# Patient Record
Sex: FEMALE | Race: WHITE | Employment: OTHER | URBAN - METROPOLITAN AREA
[De-identification: names, ages, dates, MRNs, and addresses within clinical notes are randomized per-mention and may not be internally consistent; named-entity substitution may affect disease eponyms.]

---

## 2017-01-03 ENCOUNTER — OFFICE VISIT (OUTPATIENT)
Dept: SURGERY | Age: 62
End: 2017-01-03

## 2017-01-03 VITALS
HEART RATE: 76 BPM | DIASTOLIC BLOOD PRESSURE: 85 MMHG | SYSTOLIC BLOOD PRESSURE: 154 MMHG | HEIGHT: 64 IN | BODY MASS INDEX: 25.44 KG/M2 | OXYGEN SATURATION: 95 % | WEIGHT: 149 LBS

## 2017-01-03 DIAGNOSIS — C50.412 BREAST CANCER OF UPPER-OUTER QUADRANT OF LEFT FEMALE BREAST (HCC): Primary | ICD-10-CM

## 2017-01-03 NOTE — PROGRESS NOTES
HISTORY OF PRESENT ILLNESS  Payal Hewitt is a 64 y.o. female who comes in for breast cancer follow up  Breast Cancer   Pertinent negatives include no chest pain, no abdominal pain, no headaches and no shortness of breath. Follow-up   Pertinent negatives include no chest pain, no abdominal pain, no headaches and no shortness of breath. She went for screening mammogram subsequently at left dx mammogram and US 2016 at [facility] and was noted to have a 2.9 x 2.1 x 2.6 cm hypoechoic mass at 0900 3 CFN in the left breast.   Prior to this mammogram she had not had a mammogram for over 20 years and felt it was not likely that anything was present. She denies having felt a mass. US core bx 2016 demonstrated IDC G3 ER 90% AL 30-40% her2/rachell 2+ unamplified by FISH. She denies prior breast problems, masses, skin changes, nipple changes or drainage, unexplained weight loss or bone pain. She had menarche at 15, is nulliparous, had menopause at 52 and did not take HRT. She denies family hx of breast or ovarian cancer. Her father  of liver cancer (heavy drinker). Breast MRI suggested only the known disease. She underwent a left lumpectomy and SLNB for a T2N0M0 IDC. Mammaprint suggested high risk. She received chemotherapy with Dr Darío Mcdonald and WBRT by Dr Audrey Armas and now has started letrozole.     Past Medical History   Diagnosis Date    Breast CA ([address]      L breast    Depression     Hyperlipemia      Past Surgical History   Procedure Laterality Date    Hx breast lumpectomy Left 2016     LEFT BREAST LUMPECTOMY WITH ULTRASOUND GUIDANCE/SENTINEL NODE BIOPSY performed by Kimberly Arora MD at [facility] AMBULATORY OR    Hx other surgical  2016     Insertion of right subclavian power port     Family History   Problem Relation Age of Onset    Alcohol abuse Mother     Cancer Father      liver    Alcohol abuse Father     No Known Problems Brother      Social History Substance Use Topics    Smoking status: Never Smoker    Smokeless tobacco: Never Used    Alcohol use 2.4 oz/week     4 Standard drinks or equivalent per week      Comment: occassionally     Current Outpatient Prescriptions   Medication Sig    PYRIDOXINE HCL (VITAMIN B-6 PO) Take  by mouth daily.  albuterol (PROVENTIL HFA, VENTOLIN HFA, PROAIR HFA) 90 mcg/actuation inhaler Take 1 Puff by inhalation every six (6) hours as needed for Wheezing.  metroNIDAZOLE (METROGEL) 0.75 % topical gel Apply  to affected area two (2) times a day.  citalopram (CELEXA) 40 mg tablet Take 1 Tab by mouth daily.  simvastatin (ZOCOR) 10 mg tablet Take 1 Tab by mouth nightly.  multivitamin (ONE A DAY) tablet Take 1 Tab by mouth daily.  HYDROcodone-acetaminophen (NORCO) 5-325 mg per tablet Take 1-2 Tabs by mouth every six (6) hours as needed for Pain. Max Daily Amount: 8 Tabs.  HYDROcodone-acetaminophen (NORCO) 5-325 mg per tablet Take 1-2 Tabs by mouth every six (6) hours as needed for Pain. Max Daily Amount: 8 Tabs. No current facility-administered medications for this visit. No Known Allergies    Review of Systems   Constitutional: Negative for chills, diaphoresis, fever, malaise/fatigue and weight loss. HENT: Negative for congestion, ear pain and sore throat. Eyes: Negative for blurred vision and pain. Respiratory: Negative for cough, hemoptysis, sputum production, shortness of breath, wheezing and stridor. Cardiovascular: Negative for chest pain, palpitations, orthopnea, claudication, leg swelling and PND. Gastrointestinal: Negative for abdominal pain, blood in stool, constipation, diarrhea, heartburn, melena, nausea and vomiting. Genitourinary: Negative for dysuria, flank pain, frequency, hematuria and urgency. Musculoskeletal: Negative for back pain, joint pain, myalgias and neck pain. Skin: Negative for itching and rash.    Neurological: Negative for dizziness, tremors, focal weakness, seizures, weakness and headaches. Endo/Heme/Allergies: Negative for polydipsia. Psychiatric/Behavioral: Negative for depression and memory loss. The patient is not nervous/anxious. Visit Vitals    /85    Pulse 76    Ht 5' 4\" (1.626 m)    Wt 67.6 kg (149 lb)    SpO2 95%    BMI 25.58 kg/m2       Physical Exam   Constitutional: She is oriented to person, place, and time. She appears well-developed and well-nourished. No distress. HENT:   Head: Normocephalic and atraumatic. Mouth/Throat: Oropharynx is clear and moist. No oropharyngeal exudate. Eyes: Conjunctivae and EOM are normal. Pupils are equal, round, and reactive to light. No scleral icterus. Neck: Normal range of motion. Neck supple. No JVD present. No tracheal deviation present. No thyromegaly present. Cardiovascular: Normal rate and regular rhythm. Exam reveals no gallop and no friction rub. No murmur heard. Pulmonary/Chest: Effort normal and breath sounds normal. No respiratory distress. She has no wheezes. She has no rales. Right breast exhibits tenderness. Right breast exhibits no inverted nipple, no mass, no nipple discharge and no skin change. Left breast exhibits tenderness. Left breast exhibits no inverted nipple, no mass, no nipple discharge and no skin change. Breasts are symmetrical (medium). Abdominal: Soft. Bowel sounds are normal. She exhibits no distension and no mass. There is no tenderness. There is no rebound and no guarding. Musculoskeletal: Normal range of motion. She exhibits no edema. Lymphadenopathy:     She has no cervical adenopathy. She has no axillary adenopathy. Right: No supraclavicular adenopathy present. Left: No supraclavicular adenopathy present. Neurological: She is alert and oriented to person, place, and time. No cranial nerve deficit. Skin: Skin is warm and dry. No rash noted. She is not diaphoretic. No erythema. No pallor.    Psychiatric: She has a normal mood and affect. Her behavior is normal. Judgment and thought content normal.     Her mammogram and US were not available for review today. ASSESSMENT and PLAN  1. Left breast ca stage 2 (T2N0M0) G3 IDC Er/MO pos, her2/rachell negative. AG at 7 months  Finished adjuvant chemotherapy and WBRT  Continue letrozole per Dr Rosaline Alvarado f/u with Catalina Mera and Татьяна Angry  Bilateral 3D dx mammogram May 2017  [id number] to remove port at her convenience  2. Needs screening colonoscopy.   Never had one and is reluctant to do so  RTC 6 months      Andres Ochoa MD FACS

## 2017-01-03 NOTE — MR AVS SNAPSHOT
Visit Information Date & Time Provider Department Dept. Phone Encounter #  
 1/3/2017 10:20 AM Steve Macias MD [facility] [place] [id number] Upcoming Health Maintenance Date Due Pneumococcal 19-64 Highest Risk (1 of 3 - PCV13) 3/27/1974 ZOSTER VACCINE AGE 60> 3/27/2015 FOBT Q 1 YEAR AGE 50-75 6/30/2017 BREAST CANCER SCRN MAMMOGRAM 4/7/2018 PAP AKA CERVICAL CYTOLOGY 7/16/2018 DTaP/Tdap/Td series (2 - Td) 3/24/2026 Allergies as of 1/3/2017  Review Complete On: 1/3/2017 By: Steve Macias MD  
 No Known Allergies Current Immunizations  Reviewed on 3/24/2016 Name Date Influenza Vaccine 11/30/2016, 12/1/2014 Tdap 3/24/2016 Not reviewed this visit You Were Diagnosed With   
  
 Codes Comments Breast cancer of upper-outer quadrant of left female breast ([facility] [address]    -  Primary ICD-10-CM: M42.331 ICD-9-CM: 174.4 Vitals BP Pulse Height(growth percentile) Weight(growth percentile) SpO2 BMI  
 154/85 76 5' 4\" (1.626 m) 149 lb (67.6 kg) 95% 25.58 kg/m2 OB Status Smoking Status Postmenopausal Never Smoker BMI and BSA Data Body Mass Index Body Surface Area 25.58 kg/m 2 1.75 m 2 Preferred Pharmacy Pharmacy Name Phone [facility] [address] [place] [address] Your Updated Medication List  
  
   
This list is accurate as of: 1/3/17 12:28 PM.  Always use your most recent med list.  
  
  
  
  
 albuterol 90 mcg/actuation inhaler Commonly known as:  PROVENTIL HFA, VENTOLIN HFA, PROAIR HFA Take 1 Puff by inhalation every six (6) hours as needed for Wheezing. citalopram 40 mg tablet Commonly known as:  [place] Party Take 1 Tab by mouth daily. metroNIDAZOLE 0.75 % topical gel Commonly known as:  [place] Rex Apply  to affected area two (2) times a day. multivitamin tablet Commonly known as:  ONE A DAY  
 Take 1 Tab by mouth daily. simvastatin 10 mg tablet Commonly known as:  ZOCOR Take 1 Tab by mouth nightly. VITAMIN B-6 PO Take  by mouth daily. To-Do List   
 05/08/2017 Imaging:  ROSA 3D LAUREANO W MAMMO BI DX INCL CAD Introducing [facility] SERVICES! Dear Stephon Key: Thank you for requesting a [employer] account. Our records indicate that you already have an active [employer] account. You can access your account anytime at [email or web address] Did you know that you can access your hospital and ER discharge instructions at any time in [employer]? You can also review all of your test results from your hospital stay or ER visit. Additional Information If you have questions, please visit the Frequently Asked Questions section of the [employer] website at [email or web address] Remember, [employer] is NOT to be used for urgent needs. For medical emergencies, dial 911. Now available from your iPhone and Android! Please provide this summary of care documentation to your next provider. Your primary care clinician is listed as Yu Ewing. If you have any questions after today's visit, please call [phone].

## 2017-01-10 DIAGNOSIS — F32.A DEPRESSION, UNSPECIFIED DEPRESSION TYPE: ICD-10-CM

## 2017-01-10 RX ORDER — CITALOPRAM 40 MG/1
40 TABLET, FILM COATED ORAL DAILY
Qty: 90 TAB | Refills: 3 | OUTPATIENT
Start: 2017-01-10

## 2017-01-10 RX ORDER — CITALOPRAM 40 MG/1
40 TABLET, FILM COATED ORAL DAILY
Qty: 90 TAB | Refills: 3 | Status: SHIPPED | OUTPATIENT
Start: 2017-01-10 | End: 2017-04-23 | Stop reason: SDUPTHER

## 2017-02-13 ENCOUNTER — TELEPHONE (OUTPATIENT)
Dept: ONCOLOGY | Age: 62
End: 2017-02-13

## 2017-02-13 NOTE — TELEPHONE ENCOUNTER
Unable to reach her today, and the  greeting is for a business--not her personal phone. Introduce myself as an NP with [employer] and that I don't have any bad news for her, but simply want to let her know some information I will be putting together for her and will be sending by mail in the next few days. Left both of my contact phone #s in the message for her return call. Will explain in greater detail whenever I am able to speak with her and give her the option of having this sent via [address]

## 2017-02-23 ENCOUNTER — DOCUMENTATION ONLY (OUTPATIENT)
Dept: ONCOLOGY | Age: 62
End: 2017-02-23

## 2017-02-23 NOTE — PROGRESS NOTES
[address]   [address] [place], [address], [address] [facility]    Breast Cancer Survivorship Care Plan      GENERAL INFORMATION    NAME/AGE/GENDER: Bryon Dunham is a 64 y.o. female who was born on 1955. PHONE: [phone]     Date: 2/23/2017    Referring Provider: none    Patient Care Team:  Raheem Brandt MD as PCP - General (Family Practice) [address] [facility] [phone]  Pamela Mackay MD as Surgeon (General Surgery) [facility] [phone]  Rain Cool NP as Nurse Practitioner (Cancer Survivorship) Medical Oncology [phone]  Alexandra Martins MD as Physician (Hematology and Oncology) [address] [place] [address] [phone]  Srinath Garcia MD as Physician (Radiation Oncology) [facility] [phone]    DIAGNOSIS    Cancer type/location/histologic subtype/receptor status: LEFT breast, 2.6 cm Grade II invasive ductal carcinoma and nuclear grade II ductal carcinoma in situ (DCIS), ER/SD+, HER2-       Date of diagnosis (year): 2016    TNM/Stage: pT2pN0/Stage 2    Oncotype DX results from 6/21/16--breast cancer recurrence score of 20 (intermediate risk), corresponding to an average rate of distant recurrence of 13% in 10 years. The clinical validation study included 668 patients from the NSABP B-14 study. The study included Stage 1 and 2 female breast cancer patients who were ER positive, lymph node negative, and were treated with Tamoxifen for 5 years. MammaPrint Genomic Profiling result--High Risk, per Dr. Donnell Bains note  A MammaPrint High Risk result means that a patient with early stage breast cancer has a higher risk of distant recurrence without adjuvant systemic therapy [chemotherapy]. For High Risk patients, there is a 29% probability of distant recurrence within 10 years.     FOLLOW-UP CARE PLAN    Cancer Surveillance or Other Recommended Related Tests        Name of test  When/How often Ordering Provider   Mammogram Once a year Dr. Lulu Badillo scan (bone density study) Consider every two years, after baseline study, while on Letrozole Dr. Gordon William     Please continue to see your primary care provider for all general health care recommended for a woman your age, including cancer screening tests. Possible late and long-term effects that someone with this type of cancer and treatment may experience:  bone effects, elevated cholesterol, lymphedema, arthralgias / myalgias (joint / muscle aches and pains), hot flashes and vaginal dryness. Please ask Dr. Gordon William if there are any potential late and long-term effects of your type of chemotherapy. Thank you! Schedule of Clinical Visits        Coordinating Provider  When/How often Contact information   Primary Care Provider As needed for non-cancer health care [phone] Every 3 to 6 months for the first 3 years, every 6 to 12 months for years 4 and 5, and annually thereafter [phone]     Radiation Oncologist Rotate visits with medical oncologist [phone]     Surgeon Rotate visits with medical oncologist [phone]       CANCER TREATMENT SUMMARY     Surgery: Date and procedure/location/findings: 5/23/16 LEFT lumpectomy and LEFT axillary sentinel lymph node biopsy, margins clear but DCIS <0.1 mm from medial margin, lymphovascular invasion not identified, 2 lymph nodes removed--both were negative for cancer    Radiation: Yes End date (year): completed 11/23/16   Body area treated/dosage: According to Dr. Chaz Myrick consult note, the plan was for you to have whole breast radiation to your left breast at a total dose of 4256 cGy with a boost of 6 additional fractions to the tumor bed.   Your end of treatment summary, which tells exactly how much radiation you received and to what areas, is not available to me in your [facility] record, [facility]. Please discuss these details with Dr. J Luis Chang at an upcoming appointment or ask him for a copy of your radiation end of treatment summary for your records. Thank you! Systemic Therapy (chemotherapy, biologics, other): Yes: I do not have access to your records with Dr. Ann Marie Paulino which detail your chemotherapy course. I would strongly advise that you ask her for a summary of this information when you return for a follow-up visit if you do not already have it. Thank you! Persistent symptoms or side effects at completion of treatment: unknown, per documentation in [facility]    Clinical Trial: No     Date of other cancer and/or recurrence of primary cancer and subsequent treatment: none, according to documentation in [address] [place] [address]    Family history of cancer: father with liver cancer    Genetic/hereditary risk factor(s) or predisposing conditions: none apparent  Genetic testing: No     CONTINUING TREATMENT    Need for Ongoing (Adjuvant) Treatment for Cancer: Yes, Letrozole 2.5 mg daily; began at the end of radiation therapy                Additional treatment name Possible side effects Planned duration   Anastrozole (Arimidex) or Letrozole (Femara) or Exemestane (Aromasin)   Joint aches and pains, vaginal dryness, loss of interest in sex, bone loss or thinning, bone fractures, elevated cholesterol, hot flashes Currently recommended for at least 5 years     DOING YOUR PART AS A CANCER SURVIVOR    Many survivors feel worried or anxious that the cancer will come back after treatment. While it often does not, its important to talk with your doctor about the possibility of the cancer returning. Most breast cancer recurrences are found by patients between visits.  Tell your doctor if you notice any of the following symptoms, as they may be signs of a cancer recurrence:     · New lumps in the breast  · Bone pain  · Chest pain  · Abdominal pain  · Shortness of breath or difficulty breathing  · Persistent headaches  · Persistent coughing  · Rash on breast  · Nipple discharge (liquid coming from the nipple)    Or any other symptoms should be brought to the attention of your provider:   1. Anything that represents a brand new symptom   2. Anything that represents a persistent symptom   3. Anything you are worried about that might be related to the cancer coming back    Cancer survivors may experience issues with the areas listed below. If you have any concerns in these or other areas, please speak with your survivorship nurse practitioner or a member of your physician team to find out how to get help with them. Emotional and mental health, fatigue, weight changes, stopping smoking, physical functioning, insurance, financial advice/assistance, school/work issues, parenting, memory or concentration loss, fertility, sexual functioning, or any other survivorship concerns            General Guidelines for Health and Cancer Prevention/Risk Reduction      · Work to achieve and maintain a healthy weight  · Engage in regular physical activity including:  Aerobic exercise at least 150 minutes per week                            Strength training exercise at least 2 days per week    · Eat a diet that is high in vegetables, fruits, whole grains, legumes (beans) and low in saturated fats (animal fats)    · Janet Buggy not start using tobacco  · Limit alcohol consumption to no more than 1 drink per day for women/no more than 2 drinks per day for men    If you have any questions or need additional information/support, please discuss these recommendations with your doctor or nurse practitioner.         RESOURCES FOR THE JOURNEY    Breast Cancer Specific:  Living Beyond Breast Cancer www.lbbc.org  Breast Cancer. org Marialuisamilar.no. [facility] [facility] http://wwMichael.betty. [address] www.[facility] [place]. org    General:  [facility] www.[facility]. [address] [facility] www.cancer. [address] www.cancer. org  American Society of Clinical Oncology http://kitchenDeep Ninese.com/. net/research-and-advocacy/asco-care-and-treatment-  recommendations-patients/follow-care-breast-cancer  [facility] www.[facility]. [facility] [employer] www.canceradvocacy. 3 Jennifer Sellers www.nccn. org  [facility] [place] [facility] www. patientadvocate. org    Prepared By: Miguelina Dias [facility]  [address]  [phone] or [phone]  [email or web address]    Delivered on: 2/24/17    This [address] [place] [address] is a cancer treatment summary and follow-up plan provided to you to keep with your healthcare records and to share with your healthcare providers. This summary is a brief record of major aspects of your cancer treatment, not a detailed or comprehensive record of your care.

## 2017-04-03 ENCOUNTER — OFFICE VISIT (OUTPATIENT)
Dept: FAMILY MEDICINE CLINIC | Age: 62
End: 2017-04-03

## 2017-04-03 VITALS
DIASTOLIC BLOOD PRESSURE: 86 MMHG | TEMPERATURE: 96.7 F | RESPIRATION RATE: 20 BRPM | WEIGHT: 150.2 LBS | OXYGEN SATURATION: 97 % | SYSTOLIC BLOOD PRESSURE: 138 MMHG | BODY MASS INDEX: 25.64 KG/M2 | HEART RATE: 81 BPM | HEIGHT: 64 IN

## 2017-04-03 DIAGNOSIS — I10 ESSENTIAL HYPERTENSION: Primary | ICD-10-CM

## 2017-04-03 DIAGNOSIS — F32.A DEPRESSION, UNSPECIFIED DEPRESSION TYPE: ICD-10-CM

## 2017-04-03 DIAGNOSIS — Z23 ENCOUNTER FOR IMMUNIZATION: ICD-10-CM

## 2017-04-03 DIAGNOSIS — G47.00 INSOMNIA, UNSPECIFIED TYPE: ICD-10-CM

## 2017-04-03 DIAGNOSIS — E78.5 HYPERLIPIDEMIA, UNSPECIFIED HYPERLIPIDEMIA TYPE: ICD-10-CM

## 2017-04-03 RX ORDER — HYDROCHLOROTHIAZIDE 12.5 MG/1
12.5 TABLET ORAL DAILY
Qty: 30 TAB | Refills: 0 | Status: SHIPPED | OUTPATIENT
Start: 2017-04-03 | End: 2017-12-07 | Stop reason: SDUPTHER

## 2017-04-03 RX ORDER — ZINC GLUCONATE 10 MG
LOZENGE ORAL
COMMUNITY
End: 2018-01-08

## 2017-04-03 RX ORDER — LETROZOLE 2.5 MG/1
TABLET, FILM COATED ORAL
COMMUNITY
Start: 2017-03-20

## 2017-04-03 NOTE — PROGRESS NOTES
Chief Complaint   Patient presents with    Hypertension     patient would like to discuss her blood pressure medication    Memory Loss     patient would like to discuss memory loss     Morning meds taken this Ivana Nunez LPN

## 2017-04-03 NOTE — PROGRESS NOTES
HISTORY OF PRESENT ILLNESS  Galileo Cast is a 58 y.o. female. Chief Complaint   Patient presents with    Hypertension     patient would like to discuss her blood pressure medication    Memory Loss     patient would like to discuss memory loss       HPI  HTN  Here for BP checks  Seen by Onc and BP high  And BP high at home, mostly in 140-150's/80-90's  On low salt diet  Never been on BP meds    Finished Chemo and radiation    Trouble sleeping until   Turmeric helped for 2 night  Drinks 2 glasses of ice tea    Trouble focusing  Trouble with memory  Dk finding words  Not unusual memory loss    Hyperlipidemia  Due for BW but not fasting  Will come back    Depression   On Celexa  Has refills      Review of Systems   Constitutional: Negative for fever. Respiratory: Negative for cough and shortness of breath. Cardiovascular: Negative for chest pain. Psychiatric/Behavioral: Positive for memory loss. The patient has insomnia. Past Medical History:   Diagnosis Date    Breast CA ([address] 5/16    L breast    Depression     Hyperlipemia      Current Outpatient Prescriptions   Medication Sig Dispense Refill    magnesium 250 mg tab Take  by mouth.  Omega-3 Fatty Acids (FISH OIL) 300 mg cap Take  by mouth.  hydroCHLOROthiazide (HYDRODIURIL) 12.5 mg tablet Take 1 Tab by mouth daily. 30 Tab 0    citalopram (CELEXA) 40 mg tablet Take 1 Tab by mouth daily. 90 Tab 3    simvastatin (ZOCOR) 10 mg tablet Take 1 Tab by mouth nightly. 90 Tab 3    multivitamin (ONE A DAY) tablet Take 1 Tab by mouth daily.  letrozole (FEMARA) 2.5 mg tablet       albuterol (PROVENTIL HFA, VENTOLIN HFA, PROAIR HFA) 90 mcg/actuation inhaler Take 1 Puff by inhalation every six (6) hours as needed for Wheezing. 1 Inhaler 0    metroNIDAZOLE (METROGEL) 0.75 % topical gel Apply  to affected area two (2) times a day.        No Known Allergies  Visit Vitals    /86 (BP 1 Location: Right arm, BP Patient Position: Sitting)    Pulse 81    Temp 96.7 °F (35.9 °C) (Oral)    Resp 20    Ht 5' 4\" (1.626 m)    Wt 150 lb 3.2 oz (68.1 kg)    SpO2 97%    BMI 25.78 kg/m2       Physical Exam   Constitutional: She is oriented to person, place, and time. She appears well-developed and well-nourished. No distress. HENT:   Head: Normocephalic and atraumatic. Left Ear: External ear normal.   Mouth/Throat: Oropharynx is clear and moist. No oropharyngeal exudate. Right Cerumen   Eyes: Conjunctivae and EOM are normal. Pupils are equal, round, and reactive to light. Cardiovascular: Normal rate, regular rhythm and normal heart sounds. Pulmonary/Chest: Effort normal and breath sounds normal.   Musculoskeletal: She exhibits no edema. Lymphadenopathy:     She has no cervical adenopathy. Neurological: She is alert and oriented to person, place, and time. Skin: Skin is warm and dry. Psychiatric: She has a normal mood and affect. Nursing note and vitals reviewed. ASSESSMENT and PLAN    ICD-10-CM ICD-9-CM    1. Essential hypertension I10 401.9 CBC WITH AUTOMATED DIFF      METABOLIC PANEL, COMPREHENSIVE      hydroCHLOROthiazide (HYDRODIURIL) 12.5 mg tablet   2. Hyperlipidemia, unspecified hyperlipidemia type E78.5 272.4 LIPID PANEL WITH LDL/HDL RATIO   3. Depression, unspecified depression type F32.9 311 Cont Celexa   4. Insomnia, unspecified type G47.00 780.52 Avoid ice tea and try hot milk   5.  Encounter for immunization Z23 V03.89 PNEUMOCOCCAL CONJ VACCINE 13 VALENT IM   recheck BP in 1 mo

## 2017-04-06 ENCOUNTER — CLINICAL SUPPORT (OUTPATIENT)
Dept: FAMILY MEDICINE CLINIC | Age: 62
End: 2017-04-06

## 2017-04-06 DIAGNOSIS — E78.5 HYPERLIPIDEMIA, UNSPECIFIED HYPERLIPIDEMIA TYPE: ICD-10-CM

## 2017-04-06 DIAGNOSIS — I10 ESSENTIAL HYPERTENSION: ICD-10-CM

## 2017-04-06 NOTE — PROGRESS NOTES
Future labs ordered by Dr Shireen Hneriquez (R) AC and Hand, patient tolerated procedure well.   Ajit Alberts RN

## 2017-04-07 LAB
ALBUMIN SERPL-MCNC: 4.3 G/DL (ref 3.6–4.8)
ALBUMIN/GLOB SERPL: 2 {RATIO} (ref 1.2–2.2)
ALP SERPL-CCNC: 71 IU/L (ref 39–117)
ALT SERPL-CCNC: 13 IU/L (ref 0–32)
AST SERPL-CCNC: 16 IU/L (ref 0–40)
BASOPHILS # BLD AUTO: 0 X10E3/UL (ref 0–0.2)
BASOPHILS NFR BLD AUTO: 1 %
BILIRUB SERPL-MCNC: 0.5 MG/DL (ref 0–1.2)
BUN SERPL-MCNC: 11 MG/DL (ref 8–27)
BUN/CREAT SERPL: 15 (ref 12–28)
CALCIUM SERPL-MCNC: 9.3 MG/DL (ref 8.7–10.3)
CHLORIDE SERPL-SCNC: 103 MMOL/L (ref 96–106)
CHOLEST SERPL-MCNC: 175 MG/DL (ref 100–199)
CO2 SERPL-SCNC: 26 MMOL/L (ref 18–29)
CREAT SERPL-MCNC: 0.71 MG/DL (ref 0.57–1)
EOSINOPHIL # BLD AUTO: 0.1 X10E3/UL (ref 0–0.4)
EOSINOPHIL NFR BLD AUTO: 4 %
ERYTHROCYTE [DISTWIDTH] IN BLOOD BY AUTOMATED COUNT: 13.5 % (ref 12.3–15.4)
GLOBULIN SER CALC-MCNC: 2.2 G/DL (ref 1.5–4.5)
GLUCOSE SERPL-MCNC: 95 MG/DL (ref 65–99)
HCT VFR BLD AUTO: 39.6 % (ref 34–46.6)
HDLC SERPL-MCNC: 61 MG/DL
HGB BLD-MCNC: 13.2 G/DL (ref 11.1–15.9)
IMM GRANULOCYTES # BLD: 0 X10E3/UL (ref 0–0.1)
IMM GRANULOCYTES NFR BLD: 0 %
INTERPRETATION, 910389: NORMAL
LDLC SERPL CALC-MCNC: 100 MG/DL (ref 0–99)
LDLC/HDLC SERPL: 1.6 RATIO UNITS (ref 0–3.2)
LYMPHOCYTES # BLD AUTO: 0.7 X10E3/UL (ref 0.7–3.1)
LYMPHOCYTES NFR BLD AUTO: 22 %
MCH RBC QN AUTO: 30.6 PG (ref 26.6–33)
MCHC RBC AUTO-ENTMCNC: 33.3 G/DL (ref 31.5–35.7)
MCV RBC AUTO: 92 FL (ref 79–97)
MONOCYTES # BLD AUTO: 0.3 X10E3/UL (ref 0.1–0.9)
MONOCYTES NFR BLD AUTO: 9 %
NEUTROPHILS # BLD AUTO: 1.9 X10E3/UL (ref 1.4–7)
NEUTROPHILS NFR BLD AUTO: 64 %
PLATELET # BLD AUTO: 188 X10E3/UL (ref 150–379)
POTASSIUM SERPL-SCNC: 4.1 MMOL/L (ref 3.5–5.2)
PROT SERPL-MCNC: 6.5 G/DL (ref 6–8.5)
RBC # BLD AUTO: 4.31 X10E6/UL (ref 3.77–5.28)
SODIUM SERPL-SCNC: 142 MMOL/L (ref 134–144)
TRIGL SERPL-MCNC: 68 MG/DL (ref 0–149)
VLDLC SERPL CALC-MCNC: 14 MG/DL (ref 5–40)
WBC # BLD AUTO: 3 X10E3/UL (ref 3.4–10.8)

## 2017-04-07 NOTE — PROGRESS NOTES
Call pt, the [facility] were a little low, RTC if any sign of infection, other wise it was normal  The sugar, kidney and liver tests were normal  The Chol is good  Cont current meds and recheck in 6 mo

## 2017-04-23 DIAGNOSIS — E78.5 HYPERLIPIDEMIA, UNSPECIFIED HYPERLIPIDEMIA TYPE: ICD-10-CM

## 2017-04-23 DIAGNOSIS — F32.A DEPRESSION, UNSPECIFIED DEPRESSION TYPE: ICD-10-CM

## 2017-04-25 RX ORDER — SIMVASTATIN 10 MG/1
10 TABLET, FILM COATED ORAL
Qty: 90 TAB | Refills: 3 | Status: SHIPPED | OUTPATIENT
Start: 2017-04-25 | End: 2017-12-07 | Stop reason: SINTOL

## 2017-04-25 RX ORDER — CITALOPRAM 40 MG/1
40 TABLET, FILM COATED ORAL DAILY
Qty: 90 TAB | Refills: 3 | Status: SHIPPED | OUTPATIENT
Start: 2017-04-25 | End: 2017-12-07 | Stop reason: SDUPTHER

## 2017-04-25 NOTE — TELEPHONE ENCOUNTER
From: Daniela Mendez  To:  Per Beal MD  Sent: 4/23/2017 6:31 PM EDT  Subject: Medication Renewal Request    Original authorizing provider: MD Daniela Renner would like a refill of the following medications:  simvastatin (ZOCOR) 10 mg tablet [Yu Ewing MD]  citalopram (CELEXA) 40 mg tablet Per Beal MD]    Preferred pharmacy: [facility], [address] [place] [address]    Comment:

## 2017-05-26 ENCOUNTER — OFFICE VISIT (OUTPATIENT)
Dept: SURGERY | Age: 62
End: 2017-05-26

## 2017-05-26 ENCOUNTER — HOSPITAL ENCOUNTER (OUTPATIENT)
Dept: MAMMOGRAPHY | Age: 62
Discharge: HOME | End: 2017-05-26
Attending: SURGERY
Payer: COMMERCIAL

## 2017-05-26 VITALS
RESPIRATION RATE: 20 BRPM | BODY MASS INDEX: 25.1 KG/M2 | SYSTOLIC BLOOD PRESSURE: 151 MMHG | HEIGHT: 64 IN | HEART RATE: 84 BPM | OXYGEN SATURATION: 95 % | WEIGHT: 147 LBS | DIASTOLIC BLOOD PRESSURE: 86 MMHG

## 2017-05-26 DIAGNOSIS — C50.412 BREAST CANCER OF UPPER-OUTER QUADRANT OF LEFT FEMALE BREAST (HCC): ICD-10-CM

## 2017-05-26 DIAGNOSIS — C50.412 BREAST CANCER OF UPPER-OUTER QUADRANT OF LEFT FEMALE BREAST (HCC): Primary | ICD-10-CM

## 2017-05-26 PROCEDURE — 77062 BREAST TOMOSYNTHESIS BI: CPT

## 2017-05-26 RX ORDER — HYDROCODONE BITARTRATE AND ACETAMINOPHEN 5; 325 MG/1; MG/1
1 TABLET ORAL
Qty: 15 TAB | Refills: 0 | Status: SHIPPED | OUTPATIENT
Start: 2017-05-26 | End: 2017-06-05

## 2017-05-26 RX ORDER — LIDOCAINE HYDROCHLORIDE 10 MG/ML
10 INJECTION, SOLUTION EPIDURAL; INFILTRATION; INTRACAUDAL; PERINEURAL ONCE
Qty: 10 ML | Refills: 0
Start: 2017-05-26 | End: 2017-05-26

## 2017-05-26 NOTE — PATIENT INSTRUCTIONS
How to Care for Your Wound After Its Treated With  DERMABOND* Topical Skin Adhesive  DERMABOND* Topical Skin Adhesive (2-octyl cyanoacrylate) is a sterile, liquid skin adhesive  that holds wound edges together. The film will usually remain in place for 5 to 10 days, then  naturally fall off your skin. The following will answer some of your questions and provide instructions for proper care for your  wound while it is healing:    CHECK WOUND APPEARANCE   Some swelling, redness, and pain are common with all wounds and normally will go away as the  wound heals. If swelling, redness, or pain increases or if the wound feels warm to the touch,  contact a doctor. Also contact a doctor if the wound edges reopen or separate. REPLACE BANDAGES   If your wound is bandaged, keep the bandage dry.  Replace the dressing daily until the adhesive film has fallen off or if the  bandage should become wet, unless otherwise instructed by your  physician.  When changing the dressing, do not place tape directly over the  DERMABOND adhesive film, because removing the tape later may also  remove the film. AVOID TOPICAL MEDICATIONS   Do not apply liquid or ointment medications or any other product to your wound while the  DERMABOND adhesive film is in place. These may loosen the film before your wound is healed. KEEP WOUND DRY AND PROTECTED   You may occasionally and briefly wet your wound in the shower or bath. Do not soak or scrub  your wound, do not swim, and avoid periods of heavy perspiration until the DERMABOND  adhesive has naturally fallen off. After showering or bathing, gently blot your wound dry with a  soft towel. If a protective dressing is being used, apply a fresh, dry bandage, being sure to keep  the tape off the DERMABOND adhesive film.  Apply a clean, dry bandage over the wound if necessary to protect it.    Protect your wound from injury until the skin has had sufficient time to heal.   Do not scratch, rub, or pick at the [facility] adhesive film. This may loosen the film before  your wound is healed.  Protect the wound from prolonged exposure to sunlight or tanning lamps while the film is in  place. If you have any questions or concerns about this product, please consult your doctor.   *Trademark ©[employer] 2002

## 2017-05-26 NOTE — PROGRESS NOTES
Procedure Note    Pre OP Dx: Breast ca, unneeded port a cath  Post OP Dx Breast ca, unneeded port a cath  Procedure Removal of right sided port a cath  Surgeon Katie  Anesthesia 1% Lidocaine with epi  10 ml  EBL   Minimal  Pathology none    Procedure  After informed consent the right chest was prepped with betadyne and draped sterilely. After a time out, local anesthetic was injected into the skin and subcutaneous tissues around the port. A 2 cm skin incision was made using the old insertion scar and the port was sharply excised. It came out intact. Interrupted 4-0 vicryl was used to close the deep layers and deep dermis and a running 4-0 vicryl was utilized as a subcuticular closure. A sterile dressing was applied. The patient tolerated the procedure well.     Maryanne Allan MD FACS

## 2017-05-26 NOTE — PROGRESS NOTES
[facility]  OFFICE PROCEDURE PROGRESS NOTE        Chart reviewed for the following:   Gricelda GABRIEL LPN, have reviewed the History, Physical and updated the Allergic reactions for [address] performed immediately prior to start of procedure:   Gricelda GABRIEL LPN, have performed the following reviews on Maria Del Carmen Suarez prior to the start of the procedure:            * Patient was identified by name and date of birth   * Agreement on procedure being performed was verified  * Risks and Benefits explained to the patient  * Procedure site verified and marked as necessary  * Patient was positioned for comfort  * Consent was signed and verified     Time: 1430      Date of procedure: 5/26/2017    Procedure performed by:  Hitesh Garcia MD    Provider assisted by: DUNIA Burnham    Patient assisted by: self    How tolerated by patient: Pt tolerated procedure well.     Post Procedural Pain Scale: 0/10    Comments: none

## 2017-05-26 NOTE — MR AVS SNAPSHOT
Visit Information Date & Time Provider Department Dept. Phone Encounter #  
 5/26/2017  2:00 PM Sandeep Medrano MD [facility] [id number] Upcoming Health Maintenance Date Due ZOSTER VACCINE AGE 60> 3/27/2015 FOBT Q 1 YEAR AGE 50-75 6/30/2017 Pneumococcal 19-64 Highest Risk (2 of 3 - PPSV23) 5/29/2017 INFLUENZA AGE 9 TO ADULT 8/1/2017 BREAST CANCER SCRN MAMMOGRAM 4/7/2018 PAP AKA CERVICAL CYTOLOGY 7/16/2018 DTaP/Tdap/Td series (2 - Td) 3/24/2026 Allergies as of 5/26/2017  Review Complete On: 5/26/2017 By: Sandeep Medrano MD  
 No Known Allergies Current Immunizations  Reviewed on 3/24/2016 Name Date Influenza Vaccine 11/30/2016, 12/1/2014 Pneumococcal Conjugate (PCV-13) 4/3/2017 Tdap 3/24/2016 Not reviewed this visit You Were Diagnosed With   
  
 Codes Comments Breast cancer of upper-outer quadrant of left female breast ([address]    -  Primary ICD-10-CM: S74.764 ICD-9-CM: 174.4 Vitals BP Pulse Resp Height(growth percentile) Weight(growth percentile) SpO2  
 151/86 (BP 1 Location: Right arm, BP Patient Position: Sitting) 84 20 5' 4\" (1.626 m) 147 lb (66.7 kg) 95% BMI OB Status Smoking Status 25.23 kg/m2 Postmenopausal Never Smoker BMI and BSA Data Body Mass Index Body Surface Area  
 25.23 kg/m 2 1.74 m 2 Preferred Pharmacy Pharmacy Name Phone [facility] [address] [place] [phone] Your Updated Medication List  
  
   
This list is accurate as of: 5/26/17  2:56 PM.  Always use your most recent med list.  
  
  
  
  
 albuterol 90 mcg/actuation inhaler Commonly known as:  PROVENTIL HFA, VENTOLIN HFA, PROAIR HFA Take 1 Puff by inhalation every six (6) hours as needed for Wheezing. citalopram 40 mg tablet Commonly known as:  Caretha Edward Take 1 Tab by mouth daily. FISH  mg Cap Generic drug:  Omega-3 Fatty Acids Take  by mouth. hydroCHLOROthiazide 12.5 mg tablet Commonly known as:  HYDRODIURIL Take 1 Tab by mouth daily. HYDROcodone-acetaminophen 5-325 mg per tablet Commonly known as:  [place] Hurt Take 1 Tab by mouth every six (6) hours as needed for Pain for up to 10 days. Max Daily Amount: 4 Tabs. letrozole 2.5 mg tablet Commonly known as:  FEMARA  
  
 magnesium 250 mg Tab Take  by mouth.  
  
 metroNIDAZOLE 0.75 % topical gel Commonly known as:  Minus Jury Apply  to affected area two (2) times a day. multivitamin tablet Commonly known as:  ONE A DAY Take 1 Tab by mouth daily. OTHER  
  
 simvastatin 10 mg tablet Commonly known as:  ZOCOR Take 1 Tab by mouth nightly. Prescriptions Printed Refills HYDROcodone-acetaminophen (NORCO) 5-325 mg per tablet 0 Sig: Take 1 Tab by mouth every six (6) hours as needed for Pain for up to 10 days. Max Daily Amount: 4 Tabs. Class: Print Route: Oral  
  
Introducing [facility]! Dear Marek Beebe: Thank you for requesting a [employer] account. Our records indicate that you already have an active [employer] account. You can access your account anytime at [email or web address]. [employer] Did you know that you can access your hospital and ER discharge instructions at any time in [employer]? You can also review all of your test results from your hospital stay or ER visit. Additional Information If you have questions, please visit the Frequently Asked Questions section of the [employer] website at [email or web address]. [employer]/. Remember, [employer] is NOT to be used for urgent needs. For medical emergencies, dial 911. Now available from your iPhone and Android! Please provide this summary of care documentation to your next provider. Your primary care clinician is listed as Yu Ewing.  If you have any questions after today's visit, please call [phone].

## 2017-06-02 ENCOUNTER — TELEPHONE (OUTPATIENT)
Dept: SURGERY | Age: 62
End: 2017-06-02

## 2017-06-02 NOTE — TELEPHONE ENCOUNTER
Patients has no drainage from site only redness is where bandage was, she has stop using band aids, encouraged patient to monitor site and if worsens call office.

## 2017-06-05 ENCOUNTER — TELEPHONE (OUTPATIENT)
Dept: SURGERY | Age: 62
End: 2017-06-05

## 2017-06-05 NOTE — TELEPHONE ENCOUNTER
Patient still has rash, it seems to be spreading. She will be in town Wednesday if Dr Kiah Martinez wants to see her.

## 2017-06-07 ENCOUNTER — OFFICE VISIT (OUTPATIENT)
Dept: SURGERY | Age: 62
End: 2017-06-07

## 2017-06-07 VITALS
WEIGHT: 147.5 LBS | HEIGHT: 64 IN | HEART RATE: 86 BPM | BODY MASS INDEX: 25.18 KG/M2 | SYSTOLIC BLOOD PRESSURE: 130 MMHG | DIASTOLIC BLOOD PRESSURE: 93 MMHG | OXYGEN SATURATION: 95 %

## 2017-06-07 DIAGNOSIS — C50.412 BREAST CANCER OF UPPER-OUTER QUADRANT OF LEFT FEMALE BREAST (HCC): ICD-10-CM

## 2017-06-07 DIAGNOSIS — T78.40XA ALLERGIC REACTION, INITIAL ENCOUNTER: Primary | ICD-10-CM

## 2017-06-07 RX ORDER — METHYLPREDNISOLONE 4 MG/1
TABLET ORAL
Qty: 1 DOSE PACK | Refills: 0 | Status: SHIPPED | OUTPATIENT
Start: 2017-06-07 | End: 2017-12-07 | Stop reason: ALTCHOICE

## 2017-06-07 NOTE — PROGRESS NOTES
HISTORY OF PRESENT ILLNESS  Ashanti Stephens is a 58 y.o. female who comes in for breast cancer follow up  Rash    Pertinent negatives include no itching. Breast Cancer   Pertinent negatives include no chest pain, no abdominal pain, no headaches and no shortness of breath. Follow-up   Pertinent negatives include no chest pain, no abdominal pain, no headaches and no shortness of breath. She went for screening mammogram subsequently at left dx mammogram and US 2016 at [facility] and was noted to have a 2.9 x 2.1 x 2.6 cm hypoechoic mass at 0900 3 CFN in the left breast.   Prior to this mammogram she had not had a mammogram for over 20 years and felt it was not likely that anything was present. She denies having felt a mass. US core bx 2016 demonstrated IDC G3 ER 90% DE 30-40% her2/rachell 2+ unamplified by FISH. She denies prior breast problems, masses, skin changes, nipple changes or drainage, unexplained weight loss or bone pain. She had menarche at 15, is nulliparous, had menopause at 52 and did not take HRT. She denies family hx of breast or ovarian cancer. Her father  of liver cancer (heavy drinker). Breast MRI suggested only the known disease. She underwent a left lumpectomy and SLNB for a T2N0M0 IDC. Mammaprint suggested high risk. She received chemotherapy with Dr Phani Massey and WBRT by Dr Edy Metzgre and now has started letrozole. She had a mammogram 2017 which was ok.     Past Medical History:   Diagnosis Date    Breast CA ([address]     L breast    Depression     Hyperlipemia      Past Surgical History:   Procedure Laterality Date    HX BREAST LUMPECTOMY Left 2016    LEFT BREAST LUMPECTOMY WITH ULTRASOUND GUIDANCE/SENTINEL NODE BIOPSY performed by Abril Skinner MD at [facility] OR    HX OTHER SURGICAL  2016    Insertion of right subclavian power port     Family History   Problem Relation Age of Onset    Alcohol abuse Mother     Cancer Father      liver    Alcohol abuse Father     No Known Problems Brother     Breast Cancer Paternal Aunt      Social History   Substance Use Topics    Smoking status: Never Smoker    Smokeless tobacco: Never Used    Alcohol use 2.4 oz/week     4 Standard drinks or equivalent per week      Comment: occassionally     Current Outpatient Prescriptions   Medication Sig    OTHER     simvastatin (ZOCOR) 10 mg tablet Take 1 Tab by mouth nightly.  citalopram (CELEXA) 40 mg tablet Take 1 Tab by mouth daily.  magnesium 250 mg tab Take  by mouth.  Omega-3 Fatty Acids (FISH OIL) 300 mg cap Take  by mouth.  letrozole (FEMARA) 2.5 mg tablet     hydroCHLOROthiazide (HYDRODIURIL) 12.5 mg tablet Take 1 Tab by mouth daily.  albuterol (PROVENTIL HFA, VENTOLIN HFA, PROAIR HFA) 90 mcg/actuation inhaler Take 1 Puff by inhalation every six (6) hours as needed for Wheezing.  metroNIDAZOLE (METROGEL) 0.75 % topical gel Apply  to affected area two (2) times a day.  multivitamin (ONE A DAY) tablet Take 1 Tab by mouth daily. No current facility-administered medications for this visit. No Known Allergies    Review of Systems   Constitutional: Negative for chills, diaphoresis, fever, malaise/fatigue and weight loss. HENT: Negative for congestion, ear pain and sore throat. Eyes: Negative for blurred vision and pain. Respiratory: Negative for cough, hemoptysis, sputum production, shortness of breath, wheezing and stridor. Cardiovascular: Negative for chest pain, palpitations, orthopnea, claudication, leg swelling and PND. Gastrointestinal: Negative for abdominal pain, blood in stool, constipation, diarrhea, heartburn, melena, nausea and vomiting. Genitourinary: Negative for dysuria, flank pain, frequency, hematuria and urgency. Musculoskeletal: Negative for back pain, joint pain, myalgias and neck pain. Skin: Positive for rash. Negative for itching.    Neurological: Negative for dizziness, tremors, focal weakness, seizures, weakness and headaches. Endo/Heme/Allergies: Negative for polydipsia. Psychiatric/Behavioral: Negative for depression and memory loss. The patient is not nervous/anxious. Visit Vitals    BP (!) 130/93 (BP 1 Location: Right arm, BP Patient Position: Sitting)    Pulse 86    Ht 5' 4\" (1.626 m)    Wt 66.9 kg (147 lb 8 oz)    SpO2 95%    BMI 25.32 kg/m2       Physical Exam   Constitutional: She is oriented to person, place, and time. She appears well-developed and well-nourished. No distress. HENT:   Head: Normocephalic and atraumatic. Mouth/Throat: Oropharynx is clear and moist. No oropharyngeal exudate. Eyes: Conjunctivae and EOM are normal. Pupils are equal, round, and reactive to light. No scleral icterus. Neck: Normal range of motion. Neck supple. No JVD present. No tracheal deviation present. No thyromegaly present. Cardiovascular: Normal rate and regular rhythm. Exam reveals no gallop and no friction rub. No murmur heard. Pulmonary/Chest: Effort normal and breath sounds normal. No respiratory distress. She has no wheezes. She has no rales. Right breast exhibits tenderness. Right breast exhibits no inverted nipple, no mass, no nipple discharge and no skin change. Left breast exhibits tenderness. Left breast exhibits no inverted nipple, no mass, no nipple discharge and no skin change. Breasts are symmetrical (medium). Abdominal: Soft. Bowel sounds are normal. She exhibits no distension and no mass. There is no tenderness. There is no rebound and no guarding. Musculoskeletal: Normal range of motion. She exhibits no edema. Lymphadenopathy:     She has no cervical adenopathy. She has no axillary adenopathy. Right: No supraclavicular adenopathy present. Left: No supraclavicular adenopathy present. Neurological: She is alert and oriented to person, place, and time. No cranial nerve deficit. Skin: Skin is warm and dry. Rash (rash on arms, trunk) noted. She is not diaphoretic. No erythema. No pallor. Psychiatric: She has a normal mood and affect. Her behavior is normal. Judgment and thought content normal.         ASSESSMENT and PLAN  1. Left breast ca stage 2 (T2N0M0) G3 IDC Er/MN pos, her2/rachell negative. AG at 7 months  Finished adjuvant chemotherapy and WBRT  Continue letrozole per Dr Pablo Dawson f/u with Catalina Martell and Eliot Gould  Bilateral 3D dx mammogram May 2018  [address] to remove port at her convenience  2. Needs screening colonoscopy. Never had one and is reluctant to do so  3. Allergic reaction. Denies new meds.   Will give medrol dose brittany but have f/u with Rafy Pérez MD if not improving  RTC 6 months      Maryanne Allan MD FACS  ;

## 2017-06-07 NOTE — MR AVS SNAPSHOT
Visit Information Date & Time Provider Department Dept. Phone Encounter #  
 6/7/2017  1:20 PM Sally Santiago MD [facility] [id number] Your Appointments 12/8/2017  9:40 AM  
ESTABLISHED PATIENT with Sally Santigao MD  
[facility] Dr. Donaldo Hernandez ([facility]) Appt Note: port removal 5/26/17; .  
 [address] [facility], [address] [facility], [address] [id number]  
[address], [address] [facility], [address] [facility] [address] [phone] Upcoming Health Maintenance Date Due ZOSTER VACCINE AGE 60> 3/27/2015 Pneumococcal 19-64 Highest Risk (2 of 3 - PPSV23) 5/29/2017 FOBT Q 1 YEAR AGE 50-75 6/30/2017 INFLUENZA AGE 9 TO ADULT 8/1/2017 PAP AKA CERVICAL CYTOLOGY 7/16/2018 BREAST CANCER SCRN MAMMOGRAM 5/26/2019 DTaP/Tdap/Td series (2 - Td) 3/24/2026 Allergies as of 6/7/2017  Review Complete On: 6/7/2017 By: Sally Santiago MD  
 No Known Allergies Current Immunizations  Reviewed on 3/24/2016 Name Date Influenza Vaccine 11/30/2016, 12/1/2014 Pneumococcal Conjugate (PCV-13) 4/3/2017 Tdap 3/24/2016 Not reviewed this visit You Were Diagnosed With   
  
 Codes Comments Allergic reaction, initial encounter    -  Primary ICD-10-CM: T78.40XA ICD-9-CM: 995.3 Breast cancer of upper-outer quadrant of left female breast ([facility]     ICD-10-CM: N47.449 ICD-9-CM: 174.4 Vitals BP Pulse Height(growth percentile) Weight(growth percentile) SpO2 BMI  
 (!) 130/93 (BP 1 Location: Right arm, BP Patient Position: Sitting) 86 5' 4\" (1.626 m) 147 lb 8 oz (66.9 kg) 95% 25.32 kg/m2 OB Status Smoking Status Postmenopausal Never Smoker BMI and BSA Data Body Mass Index Body Surface Area  
 25.32 kg/m 2 1.74 m 2 Preferred Pharmacy Pharmacy Name Phone [address] [place] [address]  [phone] Your Updated Medication List  
  
   
This list is accurate as of: 6/7/17  5:14 PM.  Always use your most recent med list.  
  
  
  
  
 albuterol 90 mcg/actuation inhaler Commonly known as:  PROVENTIL HFA, VENTOLIN HFA, PROAIR HFA Take 1 Puff by inhalation every six (6) hours as needed for Wheezing. citalopram 40 mg tablet Commonly known as:  Hyla Juan J Take 1 Tab by mouth daily. FISH  mg Cap Generic drug:  Omega-3 Fatty Acids Take  by mouth. hydroCHLOROthiazide 12.5 mg tablet Commonly known as:  HYDRODIURIL Take 1 Tab by mouth daily. letrozole 2.5 mg tablet Commonly known as:  FEMARA  
  
 magnesium 250 mg Tab Take  by mouth.  
  
 methylPREDNISolone 4 mg tablet Commonly known as:  [place] Viki As directed  
  
 metroNIDAZOLE 0.75 % topical gel Commonly known as:  Siria Cashing Apply  to affected area two (2) times a day. multivitamin tablet Commonly known as:  ONE A DAY Take 1 Tab by mouth daily. OTHER  
  
 simvastatin 10 mg tablet Commonly known as:  ZOCOR Take 1 Tab by mouth nightly. Prescriptions Sent to Pharmacy Refills  
 methylPREDNISolone (MEDROL DOSEPACK) 4 mg tablet 0 Sig: As directed Class: Normal  
 Pharmacy: [address] [place] [address]  Ph #: [phone] [facility]! Dear Sadie Navarrete: Thank you for requesting a [employer] account. Our records indicate that you already have an active [employer] account. You can access your account anytime at [email or web address] Did you know that you can access your hospital and ER discharge instructions at any time in [employer]? You can also review all of your test results from your hospital stay or ER visit. Additional Information If you have questions, please visit the Frequently Asked Questions section of the [employer] website at [email or web address] Remember, [employer] is NOT to be used for urgent needs. For medical emergencies, dial 911. Now available from your iPhone and Android! Please provide this summary of care documentation to your next provider. Your primary care clinician is listed as Yu Ewing. If you have any questions after today's visit, please call [phone].

## 2017-12-07 ENCOUNTER — OFFICE VISIT (OUTPATIENT)
Dept: FAMILY MEDICINE CLINIC | Age: 62
End: 2017-12-07

## 2017-12-07 VITALS
HEART RATE: 89 BPM | OXYGEN SATURATION: 97 % | WEIGHT: 146.8 LBS | BODY MASS INDEX: 25.2 KG/M2 | TEMPERATURE: 95.5 F | SYSTOLIC BLOOD PRESSURE: 146 MMHG | RESPIRATION RATE: 16 BRPM | DIASTOLIC BLOOD PRESSURE: 95 MMHG

## 2017-12-07 DIAGNOSIS — F32.A DEPRESSION, UNSPECIFIED DEPRESSION TYPE: ICD-10-CM

## 2017-12-07 DIAGNOSIS — Z17.0 MALIGNANT NEOPLASM OF LEFT BREAST IN FEMALE, ESTROGEN RECEPTOR POSITIVE, UNSPECIFIED SITE OF BREAST (HCC): ICD-10-CM

## 2017-12-07 DIAGNOSIS — I10 ESSENTIAL HYPERTENSION: Primary | ICD-10-CM

## 2017-12-07 DIAGNOSIS — F51.04 PSYCHOPHYSIOLOGICAL INSOMNIA: ICD-10-CM

## 2017-12-07 DIAGNOSIS — F41.9 ANXIETY: ICD-10-CM

## 2017-12-07 DIAGNOSIS — C50.912 MALIGNANT NEOPLASM OF LEFT BREAST IN FEMALE, ESTROGEN RECEPTOR POSITIVE, UNSPECIFIED SITE OF BREAST (HCC): ICD-10-CM

## 2017-12-07 DIAGNOSIS — E78.5 HYPERLIPIDEMIA, UNSPECIFIED HYPERLIPIDEMIA TYPE: ICD-10-CM

## 2017-12-07 RX ORDER — TRAZODONE HYDROCHLORIDE 50 MG/1
50 TABLET ORAL
Qty: 30 TAB | Refills: 1 | Status: SHIPPED | OUTPATIENT
Start: 2017-12-07 | End: 2018-01-08 | Stop reason: SDUPTHER

## 2017-12-07 RX ORDER — HYDROCHLOROTHIAZIDE 12.5 MG/1
12.5 TABLET ORAL DAILY
Qty: 30 TAB | Refills: 2 | Status: SHIPPED | OUTPATIENT
Start: 2017-12-07 | End: 2018-01-08 | Stop reason: SDUPTHER

## 2017-12-07 RX ORDER — AMPICILLIN TRIHYDRATE 250 MG
600 CAPSULE ORAL
COMMUNITY

## 2017-12-07 RX ORDER — CITALOPRAM 40 MG/1
40 TABLET, FILM COATED ORAL DAILY
Qty: 90 TAB | Refills: 3 | Status: SHIPPED | OUTPATIENT
Start: 2017-12-07 | End: 2018-01-08 | Stop reason: SDUPTHER

## 2017-12-07 RX ORDER — UREA 10 %
LOTION (ML) TOPICAL
COMMUNITY
End: 2018-07-12

## 2017-12-07 NOTE — PROGRESS NOTES
Chief Complaint   Patient presents with    Medication Refill     citalopram     Health Maintenance Due   Topic Date Due    ZOSTER VACCINE AGE 60>  01/27/2015    Pneumococcal 19-64 Highest Risk (2 of 3 - PPSV23) 05/29/2017    FOBT Q 1 YEAR AGE 50-75  06/30/2017     Visit Vitals    BP (!) 146/95 (BP 1 Location: Right arm, BP Patient Position: Sitting)    Pulse 89    Temp 95.5 °F (35.3 °C) (Oral)    Resp 16    Wt 146 lb 12.8 oz (66.6 kg)    SpO2 97%    BMI 25.2 kg/m2     Ángel Funes LPN

## 2017-12-07 NOTE — PROGRESS NOTES
Lucia Grubbs is a 58 y.o. female who presents to the office today with the following:  Chief Complaint   Patient presents with    Medication Refill     citalopram       No Known Allergies    Current Outpatient Prescriptions   Medication Sig    red yeast rice extract 600 mg cap Take 600 mg by mouth now.  melatonin 1 mg tablet Take  by mouth.  hydroCHLOROthiazide (HYDRODIURIL) 12.5 mg tablet Take 1 Tab by mouth daily.  traZODone (DESYREL) 50 mg tablet Take 1 Tab by mouth nightly.  citalopram (CELEXA) 40 mg tablet Take 1 Tab by mouth daily.  OTHER     magnesium 250 mg tab Take  by mouth.  letrozole (FEMARA) 2.5 mg tablet     albuterol (PROVENTIL HFA, VENTOLIN HFA, PROAIR HFA) 90 mcg/actuation inhaler Take 1 Puff by inhalation every six (6) hours as needed for Wheezing.  metroNIDAZOLE (METROGEL) 0.75 % topical gel Apply  to affected area two (2) times a day.  multivitamin (ONE A DAY) tablet Take 1 Tab by mouth daily.  Omega-3 Fatty Acids (FISH OIL) 300 mg cap Take  by mouth. No current facility-administered medications for this visit. Past Medical History:   Diagnosis Date    Breast CA ([address] 5/16    L breast    Depression     Hyperlipemia        Past Surgical History:   Procedure Laterality Date    HX BREAST LUMPECTOMY Left 5/23/2016    LEFT BREAST LUMPECTOMY WITH ULTRASOUND GUIDANCE/SENTINEL NODE BIOPSY performed by Eulogio Singh MD at [facility] AMBULATORY OR    HX OTHER SURGICAL  07/18/2016    Insertion of right subclavian power port       History   Smoking Status    Never Smoker   Smokeless Tobacco    Never Used       Family History   Problem Relation Age of Onset    Alcohol abuse Mother     Cancer Father      liver    Alcohol abuse Father     No Known Problems Brother     Breast Cancer Paternal Aunt          History of Present Illness:  Patient here for ongoing medical follow-up and medication refills    Patient has a history of anxiety and depression. Currently on citalopram.  That was increased about a year ago. She states is working very well for her depression. Not sad. No thoughts of hurting herself. No side effects from medication. It does work well for her anxiety during the day. Patient does have some anxiety issues at bedtime. Her mind tends to race and it is hard for her to fall asleep. She was given Ambien in the past from her oncologist but not currently taking this. She would be willing to augment her regiment. Patient does have a history of hypertension. She was started on hydrochlorothiazide in the spring. She decided not to continue with this medication. Her blood pressure still remains elevated. Most recent base metabolic profile normal.  She is having no cardiac complaints. We discussed this today. She would be willing to restart low-dose hydrochlorothiazide. She also has hyperlipidemia. She was started on Zocor previously. She did develop some myalgias. She stopped the medication. She does not want medication for lipids at this point. She is aware they are indicated. She does agree to follow-up after the first of the year and would consider getting some lab work at that time. She has been treated for left breast cancer. She has had surgery chemo and radiation. She is now on hormonal therapy. She is felt to be free of disease and follows routinely with oncology. Her past medical history is otherwise negative    We did discuss additional cancer screens including Pap smears and colon screening. She is aware but declines both. Patient's had the flu shot. She has had the Prevnar. She declines Pneumovax at this point.   She declines Zostavax      Review of Systems:      Review of systems negative except as noted above    Physical Exam:  Visit Vitals    BP (!) 146/95 (BP 1 Location: Right arm, BP Patient Position: Sitting)    Pulse 89    Temp 95.5 °F (35.3 °C) (Oral)    Resp 16    Wt 146 lb 12.8 oz (66.6 kg)    SpO2 97%    BMI 25.2 kg/m2     Vitals:    12/07/17 1450   BP: (!) 146/95   BP 1 Location: Right arm   BP Patient Position: Sitting   Pulse: 89   Resp: 16   Temp: 95.5 °F (35.3 °C)   TempSrc: Oral   SpO2: 97%   Weight: 146 lb 12.8 oz (66.6 kg)     Patient no acute distress vital signs on repeat as above  Patient dressed appropriately. Good eye contact. Did not appear anxious or distressed. No psychotic or suicidal ideations    Head is normocephalic  External ears normal.  Ear canals normal TMs were clear  Eyes PERRLA. EOMs full. Sclera clear  Nose within normal limits. Nares  normal.  No significant congestion  OP mucosa normal, no obvious lesions. Pharynx normal.  No erythema or exudate. Structures midline. Neck no nodes no masses no bruits  Chest was clear no wheezes rhonchi or rales. Good air exchange  Cor regular rate and rhythm no S3-S4 no murmurs  Abdomen no HSM no masses soft and was nontender  Extremities no edema. Nontender. Good range of motion  Gait and gross neurologic exam were normal    Assessment/Plan:  1. Essential hypertension  Patient with hypertension. Previously tolerated hydrochlorthiazide. I am restarting the medication. She will follow-up next month for recheck and lab work  - hydroCHLOROthiazide (HYDRODIURIL) 12.5 mg tablet; Take 1 Tab by mouth daily. Dispense: 30 Tab; Refill: 2    2. Depression, unspecified depression type  We will continue current dose of Celexa as this is working quite well for her  - citalopram (CELEXA) 40 mg tablet; Take 1 Tab by mouth daily. Dispense: 90 Tab; Refill: 3    3. Hyperlipidemia, unspecified hyperlipidemia type  Declining therapy at this point. She will work on her diet and exercise. She is started over-the-counter red yeast    4. Anxiety  On citalopram    5. Malignant neoplasm of left breast in female, estrogen receptor positive, unspecified site of breast [facility])  Following with oncology    6.  Psychophysiological insomnia  We will try patient on some trazodone at bedtime. She will notify me if any problems  - traZODone (DESYREL) 50 mg tablet; Take 1 Tab by mouth nightly. Dispense: 30 Tab; Refill: 1    Patient Instructions   Same meds   add trazadone at bedtime  Restart HCTZ in AM          Continue current therapy plan except for indicated above. Verbal and written instructions (see AVS) provided.  Patient expresses understanding of diagnosis and treatment plan. Follow-up Disposition:  Return in about 1 month (around 1/7/2018). Camille Martinez.  Germán Tavera MD

## 2017-12-07 NOTE — MR AVS SNAPSHOT
Visit Information Date & Time Provider Department Dept. Phone Encounter #  
 12/7/2017  2:40 PM Yokasta Gutierrez MD [address] [facility] [id number] Follow-up Instructions Return in about 1 month (around 1/7/2018). Your Appointments 12/8/2017  9:40 AM  
ESTABLISHED PATIENT with Mavis Espinal MD  
[facility] Dr. Donaldo [facility] ([address]) Appt Note: port removal 5/26/17; .  
 [address] [place] [address] Drive, [address] [facility], [address] [phone]  
[address], [address] [facility], [address] [facility] [address] [phone] Upcoming Health Maintenance Date Due ZOSTER VACCINE AGE 60> 1/27/2015 Pneumococcal 19-64 Highest Risk (2 of 3 - PPSV23) 5/29/2017 FOBT Q 1 YEAR AGE 50-75 6/30/2017 PAP AKA CERVICAL CYTOLOGY 7/16/2018 BREAST CANCER SCRN MAMMOGRAM 5/26/2019 DTaP/Tdap/Td series (2 - Td) 3/24/2026 Allergies as of 12/7/2017  Review Complete On: 12/7/2017 By: Yokasta Gutierrez MD  
 No Known Allergies Current Immunizations  Reviewed on 3/24/2016 Name Date Influenza Vaccine 11/7/2017, 11/30/2016, 12/1/2014 Pneumococcal Conjugate (PCV-13) 4/3/2017 Tdap 3/24/2016 Not reviewed this visit You Were Diagnosed With   
  
 Codes Comments Essential hypertension    -  Primary ICD-10-CM: I10 
ICD-9-CM: 401.9 Depression, unspecified depression type     ICD-10-CM: F32.9 ICD-9-CM: 931 Hyperlipidemia, unspecified hyperlipidemia type     ICD-10-CM: E78.5 ICD-9-CM: 272.4 Anxiety     ICD-10-CM: F41.9 ICD-9-CM: 300.00 Malignant neoplasm of left breast in female, estrogen receptor positive, unspecified site of breast ([address]     ICD-10-CM: C50.912, Z17.0 ICD-9-CM: 174.9, V86.0 Psychophysiological insomnia     ICD-10-CM: F51.04 
ICD-9-CM: 307.42 Vitals BP Pulse Temp Resp Weight(growth percentile) SpO2 Nuris Stout ) 146/95 (BP 1 Location: Right arm, BP Patient Position: Sitting) 89 95.5 °F (35.3 °C) (Oral) 16 146 lb 12.8 oz (66.6 kg) 97% BMI OB Status Smoking Status 25.2 kg/m2 Postmenopausal Never Smoker BMI and BSA Data Body Mass Index Body Surface Area  
 25.2 kg/m 2 1.73 m 2 Preferred Pharmacy Pharmacy Name Phone [facility] [address] [place] [address] Eddie Fajardo [phone] Your Updated Medication List  
  
   
This list is accurate as of: 12/7/17  3:27 PM.  Always use your most recent med list.  
  
  
  
  
 albuterol 90 mcg/actuation inhaler Commonly known as:  PROVENTIL HFA, VENTOLIN HFA, PROAIR HFA Take 1 Puff by inhalation every six (6) hours as needed for Wheezing. citalopram 40 mg tablet Commonly known as:  Lisandra Narrow Take 1 Tab by mouth daily. FISH  mg Cap Generic drug:  Omega-3 Fatty Acids Take  by mouth. hydroCHLOROthiazide 12.5 mg tablet Commonly known as:  HYDRODIURIL Take 1 Tab by mouth daily. letrozole 2.5 mg tablet Commonly known as:  FEMARA  
  
 magnesium 250 mg Tab Take  by mouth.  
  
 melatonin 1 mg tablet Take  by mouth.  
  
 metroNIDAZOLE 0.75 % topical gel Commonly known as:  Edda Corolla Apply  to affected area two (2) times a day. multivitamin tablet Commonly known as:  ONE A DAY Take 1 Tab by mouth daily. OTHER  
  
 red yeast rice extract 600 mg Cap Take 600 mg by mouth now. traZODone 50 mg tablet Commonly known as:  Orma Paddy Take 1 Tab by mouth nightly. Prescriptions Sent to Pharmacy Refills  
 hydroCHLOROthiazide (HYDRODIURIL) 12.5 mg tablet 2 Sig: Take 1 Tab by mouth daily. Class: Normal  
 Pharmacy: [address] [place] [address], [facility] [address] Ph #: [phone] Route: Oral  
 traZODone (DESYREL) 50 mg tablet 1 Sig: Take 1 Tab by mouth nightly.   
 Class: Normal  
 Pharmacy: [facility], [address] Ph #: [phone] Route: Oral  
 citalopram (CELEXA) 40 mg tablet 3 Sig: Take 1 Tab by mouth daily. Class: Normal  
 Pharmacy: [facility], [address] Ph #: [phone] Route: Oral  
  
Follow-up Instructions Return in about 1 month (around 1/7/2018). Patient Instructions Same meds 
 add trazadone at bedtime Restart HCTZ in AM 
 
 
  
Introducing [facility]! Dear Anthony Seymour: Thank you for requesting a [employer] account. Our records indicate that you already have an active [employer] account. You can access your account anytime at [email or web address] Did you know that you can access your hospital and ER discharge instructions at any time in [employer]? You can also review all of your test results from your hospital stay or ER visit. Additional Information If you have questions, please visit the Frequently Asked Questions section of the [employer] website at [email or web address] Remember, [employer] is NOT to be used for urgent needs. For medical emergencies, dial 911. Now available from your iPhone and Android! Please provide this summary of care documentation to your next provider. Your primary care clinician is listed as Yu Ewing. If you have any questions after today's visit, please call [phone].

## 2018-01-08 ENCOUNTER — OFFICE VISIT (OUTPATIENT)
Dept: FAMILY MEDICINE CLINIC | Age: 63
End: 2018-01-08

## 2018-01-08 VITALS
WEIGHT: 149 LBS | SYSTOLIC BLOOD PRESSURE: 130 MMHG | RESPIRATION RATE: 16 BRPM | DIASTOLIC BLOOD PRESSURE: 90 MMHG | HEIGHT: 64 IN | TEMPERATURE: 97.7 F | HEART RATE: 79 BPM | BODY MASS INDEX: 25.44 KG/M2 | OXYGEN SATURATION: 98 %

## 2018-01-08 DIAGNOSIS — F51.04 PSYCHOPHYSIOLOGICAL INSOMNIA: ICD-10-CM

## 2018-01-08 DIAGNOSIS — Z17.0 MALIGNANT NEOPLASM OF LEFT BREAST IN FEMALE, ESTROGEN RECEPTOR POSITIVE, UNSPECIFIED SITE OF BREAST (HCC): ICD-10-CM

## 2018-01-08 DIAGNOSIS — E78.5 HYPERLIPIDEMIA, UNSPECIFIED HYPERLIPIDEMIA TYPE: ICD-10-CM

## 2018-01-08 DIAGNOSIS — F32.A DEPRESSION, UNSPECIFIED DEPRESSION TYPE: ICD-10-CM

## 2018-01-08 DIAGNOSIS — C50.912 MALIGNANT NEOPLASM OF LEFT BREAST IN FEMALE, ESTROGEN RECEPTOR POSITIVE, UNSPECIFIED SITE OF BREAST (HCC): ICD-10-CM

## 2018-01-08 DIAGNOSIS — I10 ESSENTIAL HYPERTENSION: Primary | ICD-10-CM

## 2018-01-08 PROBLEM — F33.9 RECURRENT DEPRESSION (HCC): Status: ACTIVE | Noted: 2018-01-08

## 2018-01-08 RX ORDER — HYDROCHLOROTHIAZIDE 12.5 MG/1
12.5 TABLET ORAL DAILY
Qty: 90 TAB | Refills: 1 | Status: SHIPPED | OUTPATIENT
Start: 2018-01-08 | End: 2018-07-12 | Stop reason: SDUPTHER

## 2018-01-08 RX ORDER — TRAZODONE HYDROCHLORIDE 50 MG/1
50 TABLET ORAL
Qty: 90 TAB | Refills: 1 | Status: SHIPPED | OUTPATIENT
Start: 2018-01-08 | End: 2018-07-12 | Stop reason: SDUPTHER

## 2018-01-08 RX ORDER — CITALOPRAM 40 MG/1
40 TABLET, FILM COATED ORAL DAILY
Qty: 90 TAB | Refills: 3 | Status: SHIPPED | OUTPATIENT
Start: 2018-01-08 | End: 2018-07-12 | Stop reason: SDUPTHER

## 2018-01-08 NOTE — PROGRESS NOTES
Ninoska Bundy is a 58 y.o. female who presents to the office today with the following:  Chief Complaint   Patient presents with    Medication Refill     BP check       No Known Allergies    Current Outpatient Prescriptions   Medication Sig    hydroCHLOROthiazide (HYDRODIURIL) 12.5 mg tablet Take 1 Tab by mouth daily.  citalopram (CELEXA) 40 mg tablet Take 1 Tab by mouth daily.  traZODone (DESYREL) 50 mg tablet Take 1 Tab by mouth nightly.  red yeast rice extract 600 mg cap Take 600 mg by mouth now.  melatonin 1 mg tablet Take  by mouth.  letrozole (FEMARA) 2.5 mg tablet     albuterol (PROVENTIL HFA, VENTOLIN HFA, PROAIR HFA) 90 mcg/actuation inhaler Take 1 Puff by inhalation every six (6) hours as needed for Wheezing.  metroNIDAZOLE (METROGEL) 0.75 % topical gel Apply  to affected area two (2) times a day.  multivitamin (ONE A DAY) tablet Take 1 Tab by mouth daily. No current facility-administered medications for this visit. Past Medical History:   Diagnosis Date    Breast CA ([address] 5/16    L breast    Depression     Hyperlipemia        Past Surgical History:   Procedure Laterality Date    HX BREAST LUMPECTOMY Left 5/23/2016    LEFT BREAST LUMPECTOMY WITH ULTRASOUND GUIDANCE/SENTINEL NODE BIOPSY performed by Duglas Deng MD at [facility] AMBUL[facility]ORY OR    HX OTHER SURGICAL  07/18/2016    Insertion of right subclavian power port       History   Smoking Status    Never Smoker   Smokeless Tobacco    Never Used       Family History   Problem Relation Age of Onset    Alcohol abuse Mother     Cancer Father      liver    Alcohol abuse Father     No Known Problems Brother     Breast Cancer Paternal Aunt          History of Present Illness:  Patient here for ongoing medical follow-up and medication refills    Patient has a history of anxiety and depression. Currently on citalopram.  That was increased about a year ago. She states is working very well for her depression. Not sad. No thoughts of hurting herself. No side effects from medication. It does work well for her anxiety during the day. Patient does have some anxiety issues at bedtime. Her mind tends to race and it is hard for her to fall asleep. She was given Ambien in the past from her oncologist but not currently taking this. At the last visit I added trazodone at bedtime. Patient states this is working very well. Less anxiety at night. Sleeping better. She would like to continue with this medication. Patient does have a history of hypertension. She was started on hydrochlorothiazide in the spring. She decided not to continue with this medication. Her blood pressure remained elevated at her visit in December. Most recent base metabolic profile normal.  She is having no cardiac complaints. We did restart low-dose hydrochlorothiazide. She is tolerating this well. She checks her blood pressure at home and gets readings in the 130/80 range. Normal EKG 2016. Lab work ordered today. She has no cardiac complaints. Aspirin not indicated with 10 year risk 4.8%      She also has hyperlipidemia. She was started on Zocor previously. She did develop some myalgias. She stopped the medication. She does not want medication for lipids at this point. .  She does not want additional testing for her lipids at this point, Based on current cardiovascular risk of 4.8, statins are not indicated at this time    She has been treated for left breast cancer. She has had surgery chemo and radiation. She is now on hormonal therapy. She is felt to be free of disease and follows routinely with oncology. They did routine lab work including [address] and December. Patient states it was normal    Her past medical history is otherwise negative    We did discuss additional cancer screens including Pap smears and colon screening. She is aware but continues to decline both. Patient's had the flu shot earlier this season.   She has had the Prevnar. She declines Pneumovax at this point. She declines Zostavax      Review of Systems:      Review of systems negative except as noted above    Physical Exam:  Visit Vitals    /90    Pulse 79    Temp 97.7 °F (36.5 °C) (Oral)    Resp 16    Ht 5' 4\" (1.626 m)    Wt 149 lb (67.6 kg)    SpO2 98%    BMI 25.58 kg/m2     Vitals:    01/08/18 1137 01/08/18 1206   BP: 144/90 130/90   BP 1 Location: Right arm    BP Patient Position: Sitting    Pulse: 79    Resp: 16    Temp: 97.7 °F (36.5 °C)    TempSrc: Oral    SpO2: 98%    Weight: 149 lb (67.6 kg)    Height: 5' 4\" (1.626 m)      Patient no acute distress vital signs on repeat as above  Patient dressed appropriately. Good eye contact. Did not appear anxious or distressed. No psychotic or suicidal ideations  Head is normocephalic  Neck no nodes no masses no bruits  Chest was clear no wheezes rhonchi or rales. Good air exchange  Cor regular rate and rhythm no S3-S4 no murmurs  Abdomen  soft and was nontender  Extremities no edema. Nontender. Good range of motion  Gait and gross neurologic exam were normal    1. Essential hypertension  Improved on current regiment. Diastolic blood pressure still borderline. Patient would prefer to work on some lifestyle changes rather than increase her medication further. She is can monitor her blood pressure at home. If all goes well I will see her back in 6 months sooner if needed  - hydroCHLOROthiazide (HYDRODIURIL) 12.5 mg tablet; Take 1 Tab by mouth daily. Dispense: 90 Tab; Refill: 1  - METABOLIC PANEL, BASIC  - URINALYSIS W/ RFLX MICROSCOPIC  - TSH 3RD GENERATION  - MI HANDLG&/OR CONVEY OF SPEC FOR TR OFFICE TO LAB  - MI COLLECTION VENOUS BLOOD,VENIPUNCTURE    2. Depression, unspecified depression type  Doing well on citalopram daytime and trazodone at night  - citalopram (CELEXA) 40 mg tablet; Take 1 Tab by mouth daily. Dispense: 90 Tab; Refill: 3    3.  Hyperlipidemia, unspecified hyperlipidemia type  Patient declining testing or treatment  - AR HANDLG&/OR CONVEY OF SPEC FOR TR OFFICE TO LAB  - AR COLLECTION VENOUS BLOOD,VENIPUNCTURE    4. Malignant neoplasm of left breast in female, estrogen receptor positive, unspecified site of breast ([facility] [address]  Followed by oncology    5. Psychophysiological insomnia    - traZODone (DESYREL) 50 mg tablet; Take 1 Tab by mouth nightly. Dispense: 90 Tab; Refill: 1      Patient Instructions   Continue current medications    Work on diet and exercise    Lab work today    moniter BP at home and call if it increases    Keep planned follow up visit           Continue current therapy plan except for indicated above. Verbal and written instructions (see AVS) provided.  Patient expresses understanding of diagnosis and treatment plan. Follow-up Disposition:  Return in about 6 months (around 7/8/2018). Tawny Starks.  Kelly Brady MD

## 2018-01-08 NOTE — PATIENT INSTRUCTIONS
Continue current medications    Work on diet and exercise    Lab work today    moniter BP at home and call if it increases    Keep planned follow up visit

## 2018-01-09 LAB
APPEARANCE UR: CLEAR
BILIRUB UR QL STRIP: NEGATIVE
BUN SERPL-MCNC: 12 MG/DL (ref 8–27)
BUN/CREAT SERPL: 17 (ref 12–28)
CALCIUM SERPL-MCNC: 9.4 MG/DL (ref 8.7–10.3)
CHLORIDE SERPL-SCNC: 97 MMOL/L (ref 96–106)
CO2 SERPL-SCNC: 28 MMOL/L (ref 18–29)
COLOR UR: YELLOW
CREAT SERPL-MCNC: 0.69 MG/DL (ref 0.57–1)
GLUCOSE SERPL-MCNC: 91 MG/DL (ref 65–99)
GLUCOSE UR QL: NEGATIVE
HGB UR QL STRIP: NEGATIVE
KETONES UR QL STRIP: NEGATIVE
LEUKOCYTE ESTERASE UR QL STRIP: NEGATIVE
MICRO URNS: NORMAL
NITRITE UR QL STRIP: NEGATIVE
PH UR STRIP: 5.5 [PH] (ref 5–7.5)
POTASSIUM SERPL-SCNC: 3.8 MMOL/L (ref 3.5–5.2)
PROT UR QL STRIP: NEGATIVE
SODIUM SERPL-SCNC: 141 MMOL/L (ref 134–144)
SP GR UR: 1.01 (ref 1–1.03)
TSH SERPL DL<=0.005 MIU/L-ACNC: 1.67 UIU/ML (ref 0.45–4.5)
UROBILINOGEN UR STRIP-MCNC: 0.2 MG/DL (ref 0.2–1)

## 2018-01-09 NOTE — PROGRESS NOTES
Patient has returned my call, I have advised her of her lab results per Dr Jennifer Garcia review. Patient verbalizes understanding.

## 2018-01-09 NOTE — PROGRESS NOTES
Recent lab work reviewed  All lab tests look good, within acceptable limits  Patient should continue the current medications  Continue healthy diet    Keep planned follow-up

## 2018-05-31 ENCOUNTER — HOSPITAL ENCOUNTER (OUTPATIENT)
Dept: MAMMOGRAPHY | Age: 63
Discharge: HOME | End: 2018-05-31
Attending: SURGERY
Payer: COMMERCIAL

## 2018-05-31 DIAGNOSIS — Z12.31 VISIT FOR SCREENING MAMMOGRAM: ICD-10-CM

## 2018-05-31 DIAGNOSIS — C50.919 BREAST CANCER, FEMALE (HCC): ICD-10-CM

## 2018-05-31 PROCEDURE — 77062 BREAST TOMOSYNTHESIS BI: CPT

## 2018-07-12 ENCOUNTER — OFFICE VISIT (OUTPATIENT)
Dept: FAMILY MEDICINE CLINIC | Age: 63
End: 2018-07-12

## 2018-07-12 VITALS
WEIGHT: 149.6 LBS | HEIGHT: 64 IN | DIASTOLIC BLOOD PRESSURE: 73 MMHG | HEART RATE: 96 BPM | SYSTOLIC BLOOD PRESSURE: 118 MMHG | RESPIRATION RATE: 16 BRPM | BODY MASS INDEX: 25.54 KG/M2 | TEMPERATURE: 97.8 F

## 2018-07-12 DIAGNOSIS — C50.912 MALIGNANT NEOPLASM OF LEFT BREAST IN FEMALE, ESTROGEN RECEPTOR POSITIVE, UNSPECIFIED SITE OF BREAST (HCC): ICD-10-CM

## 2018-07-12 DIAGNOSIS — F41.9 ANXIETY: ICD-10-CM

## 2018-07-12 DIAGNOSIS — F32.A DEPRESSION, UNSPECIFIED DEPRESSION TYPE: ICD-10-CM

## 2018-07-12 DIAGNOSIS — M85.80 OSTEOPENIA, UNSPECIFIED LOCATION: ICD-10-CM

## 2018-07-12 DIAGNOSIS — I10 ESSENTIAL HYPERTENSION: Primary | ICD-10-CM

## 2018-07-12 DIAGNOSIS — F51.04 PSYCHOPHYSIOLOGICAL INSOMNIA: ICD-10-CM

## 2018-07-12 DIAGNOSIS — Z17.0 MALIGNANT NEOPLASM OF LEFT BREAST IN FEMALE, ESTROGEN RECEPTOR POSITIVE, UNSPECIFIED SITE OF BREAST (HCC): ICD-10-CM

## 2018-07-12 DIAGNOSIS — G47.00 INSOMNIA, UNSPECIFIED TYPE: ICD-10-CM

## 2018-07-12 DIAGNOSIS — E78.5 HYPERLIPIDEMIA, UNSPECIFIED HYPERLIPIDEMIA TYPE: ICD-10-CM

## 2018-07-12 RX ORDER — TRAZODONE HYDROCHLORIDE 50 MG/1
50 TABLET ORAL
Qty: 90 TAB | Refills: 2 | Status: SHIPPED | OUTPATIENT
Start: 2018-07-12 | End: 2018-08-10 | Stop reason: SDUPTHER

## 2018-07-12 RX ORDER — BUSPIRONE HYDROCHLORIDE 5 MG/1
TABLET ORAL
Qty: 20 TAB | Refills: 1 | Status: SHIPPED | OUTPATIENT
Start: 2018-07-12 | End: 2019-09-26

## 2018-07-12 RX ORDER — HYDROCHLOROTHIAZIDE 12.5 MG/1
12.5 TABLET ORAL DAILY
Qty: 90 TAB | Refills: 2 | Status: SHIPPED | OUTPATIENT
Start: 2018-07-12 | End: 2018-08-10 | Stop reason: SDUPTHER

## 2018-07-12 RX ORDER — IBANDRONATE SODIUM 150 MG/1
TABLET, FILM COATED ORAL
COMMUNITY
Start: 2018-06-22

## 2018-07-12 RX ORDER — PHENOL/SODIUM PHENOLATE
20 AEROSOL, SPRAY (ML) MUCOUS MEMBRANE DAILY
COMMUNITY
End: 2018-07-12

## 2018-07-12 RX ORDER — CITALOPRAM 40 MG/1
40 TABLET, FILM COATED ORAL DAILY
Qty: 90 TAB | Refills: 2 | Status: SHIPPED | OUTPATIENT
Start: 2018-07-12 | End: 2018-08-10 | Stop reason: SDUPTHER

## 2018-07-12 NOTE — MR AVS SNAPSHOT
[place] [address] 
[phone] Patient: Margot Du MRN: [id number] VUV:[id number] Visit Information Date & Time Provider Department Dept. Phone Encounter #  
 7/12/2018  1:20 PM Nikita Shaw MD [address] [place] [address] [phone] [id number] Follow-up Instructions Return in about 6 months (around 1/12/2019). Upcoming Health Maintenance Date Due ZOSTER VACCINE AGE 60> 1/27/2015 Pneumococcal 19-64 Highest Risk (2 of 3 - PPSV23) 5/29/2017 FOBT Q 1 YEAR AGE 50-75 6/30/2017 PAP AKA CERVICAL CYTOLOGY 7/16/2018 Influenza Age 5 to Adult 8/1/2018 BREAST CANCER SCRN MAMMOGRAM 5/31/2020 DTaP/Tdap/Td series (2 - Td) 3/24/2026 Allergies as of 7/12/2018  Review Complete On: 7/12/2018 By: Nikita Shaw MD  
 No Known Allergies Current Immunizations  Reviewed on 3/24/2016 Name Date Influenza Vaccine 11/7/2017, 11/30/2016, 12/1/2014 Pneumococcal Conjugate (PCV-13) 4/3/2017 Tdap 3/24/2016 Not reviewed this visit You Were Diagnosed With   
  
 Codes Comments Essential hypertension    -  Primary ICD-10-CM: I10 
ICD-9-CM: 401.9 Depression, unspecified depression type     ICD-10-CM: F32.9 ICD-9-CM: 110 Anxiety     ICD-10-CM: F41.9 ICD-9-CM: 300.00 Hyperlipidemia, unspecified hyperlipidemia type     ICD-10-CM: E78.5 ICD-9-CM: 272.4 Malignant neoplasm of left breast in female, estrogen receptor positive, unspecified site of breast [facility]     ICD-10-CM: C50.912, Z17.0 ICD-9-CM: 174.9, V86.0 Insomnia, unspecified type     ICD-10-CM: G47.00 ICD-9-CM: 780.52 Osteopenia, unspecified location     ICD-10-CM: M85.80 ICD-9-CM: 733.90 Psychophysiological insomnia     ICD-10-CM: F51.04 
ICD-9-CM: 307.42 Vitals BP Pulse Temp Resp Height(growth percentile) Weight(growth percentile) 118/73 (BP 1 Location: Right arm, BP Patient Position: Sitting) 96 97.8 °F (36.6 °C) (Oral) 16 5' 4\" (1.626 m) 149 lb 9.6 oz (67.9 kg) BMI OB Status Smoking Status 25.68 kg/m2 Postmenopausal Never Smoker Vitals History BMI and BSA Data Body Mass Index Body Surface Area  
 25.68 kg/m 2 1.75 m 2 Preferred Pharmacy Pharmacy Name Phone [facility] Rubi Teran [facility] [phone] Your Updated Medication List  
  
   
This list is accurate as of 7/12/18  2:07 PM.  Always use your most recent med list.  
  
  
  
  
 albuterol 90 mcg/actuation inhaler Commonly known as:  PROVENTIL HFA, VENTOLIN HFA, PROAIR HFA Take 1 Puff by inhalation every six (6) hours as needed for Wheezing. busPIRone 5 mg tablet Commonly known as:  BUSPAR One daily if needed for anxiety  
  
 citalopram 40 mg tablet Commonly known as:  Kaley Frieze Take 1 Tab by mouth daily. hydroCHLOROthiazide 12.5 mg tablet Commonly known as:  HYDRODIURIL Take 1 Tab by mouth daily. ibandronate 150 mg tablet Commonly known as:  BONIVA  
  
 letrozole 2.5 mg tablet Commonly known as:  FEMARA  
  
 metroNIDAZOLE 0.75 % topical gel Commonly known as:  Keyona Huntington Apply  to affected area two (2) times a day. multivitamin tablet Commonly known as:  ONE A DAY Take 1 Tab by mouth daily. red yeast rice extract 600 mg Cap Take 600 mg by mouth now. traZODone 50 mg tablet Commonly known as:  Anthony Prosper Take 1 Tab by mouth nightly. Prescriptions Sent to Pharmacy Refills  
 traZODone (DESYREL) 50 mg tablet 2 Sig: Take 1 Tab by mouth nightly. Class: Normal  
 Pharmacy: [facility] DR GUTIERREZ GRAHAM [facility], [facility] [address] Ph #: [phone] Route: Oral  
 hydroCHLOROthiazide (HYDRODIURIL) 12.5 mg tablet 2 Sig: Take 1 Tab by mouth daily.   
 Class: Normal  
 Pharmacy: [facility] DR GUTIERREZ WELLSLASHAYANGIE [facility], [place] - [address] Ph #: [phone] Route: Oral  
 citalopram (CELEXA) 40 mg tablet 2 Sig: Take 1 Tab by mouth daily. Class: Normal  
 Pharmacy: [facility] DR GUTIERREZ WELLSLASHAYANGIE [facility], [address] Ph #: [phone] Route: Oral  
 busPIRone (BUSPAR) 5 mg tablet 1 Sig: One daily if needed for anxiety Class: Normal  
 Pharmacy: [facility] DR GUTIERREZ BUITRAGO NUANGIE [facility], [address] Ph #: [phone] Follow-up Instructions Return in about 6 months (around 1/12/2019). Patient Instructions Continue current medications 
buspar if needed for anxiety Work on diet and exercise Lab work Keep planned follow up visit Introducing [facility] SERVICES! Dear Samantha Zhang: Thank you for requesting a [employer] account. Our records indicate that you already have an active [employer] account. You can access your account anytime at [email or web address] Did you know that you can access your hospital and ER discharge instructions at any time in [employer]? You can also review all of your test results from your hospital stay or ER visit. Additional Information If you have questions, please visit the Frequently Asked Questions section of the [employer] website at [email or web address] Remember, [employer] is NOT to be used for urgent needs. For medical emergencies, dial 911. Now available from your iPhone and Android! Please provide this summary of care documentation to your next provider. Your primary care clinician is listed as Elaina Couch. If you have any questions after today's visit, please call [phone].

## 2018-07-12 NOTE — PROGRESS NOTES
Mateo Rodríguez is a 61 y.o. female who presents to the office today with the following:  Chief Complaint   Patient presents with    Hypertension     6 month f/u       No Known Allergies    Current Outpatient Prescriptions   Medication Sig    ibandronate (BONIVA) 150 mg tablet     traZODone (DESYREL) 50 mg tablet Take 1 Tab by mouth nightly.  hydroCHLOROthiazide (HYDRODIURIL) 12.5 mg tablet Take 1 Tab by mouth daily.  citalopram (CELEXA) 40 mg tablet Take 1 Tab by mouth daily.  busPIRone (BUSPAR) 5 mg tablet One daily if needed for anxiety    red yeast rice extract 600 mg cap Take 600 mg by mouth now.  letrozole (FEMARA) 2.5 mg tablet     metroNIDAZOLE (METROGEL) 0.75 % topical gel Apply  to affected area two (2) times a day.  multivitamin (ONE A DAY) tablet Take 1 Tab by mouth daily.  albuterol (PROVENTIL HFA, VENTOLIN HFA, PROAIR HFA) 90 mcg/actuation inhaler Take 1 Puff by inhalation every six (6) hours as needed for Wheezing. No current facility-administered medications for this visit. Past Medical History:   Diagnosis Date    Breast [place] ([address] 5/16    L breast    Depression     Hyperlipemia        Past Surgical History:   Procedure Laterality Date    HX BREAST LUMPECTOMY Left 5/23/2016    LEFT BREAST LUMPECTOMY WITH ULTRASOUND GUIDANCE/SENTINEL NODE BIOPSY performed by Severo Sloan, [facility] AM[facility]UL[facility] OR    HX OTHER SURGICAL  07/18/2016    Insertion of right subclavian power port       History   Smoking Status    Never Smoker   Smokeless Tobacco    Never Used       Family History   Problem Relation Age of Onset    Alcohol abuse Mother     Cancer Father      liver    Alcohol abuse Father     No Known Problems Brother     Breast Cancer Paternal Aunt          History of Present Illness:  Patient here for ongoing medical follow-up and medication refills    Patient has a history of anxiety and depression.   Currently on citalopram.  That was increased about a year ago. She states is working very well for her depression. Not sad. No thoughts of hurting herself. No side effects from medication. It usually work well for her anxiety during the day. Lately she has been dealing with a 13year-old grandson who gets her quite agitated. She did ask if there was something that she could take on an occasional basis when she got upset. She was using some Ativan from an old prescription. I did write for some BuSpar today. She will notify me if she begins to need this on a regular basis    Patient did have some anxiety issues at bedtime. Her mind tends to race and it is hard for her to fall asleep. She was given Ambien in the past from her oncologist but not currently taking this. I added trazodone at bedtime. Patient states this is working very well. Less anxiety at night. Sleeping better. She would like to continue with this medication. Patient does have a history of hypertension. She is compliant with her hydrochlorothiazide. Blood pressure in excellent control today. .  Most recent base metabolic profile normal.  She is having no cardiac complaints. We did restart low-dose hydrochlorothiazide. She is tolerating this well. She checks her blood pressure at home and gets readings in the goal range. Normal EKG 2016. She has no cardiac complaints. Aspirin not indicated with 10 year risk 4.8%. She declines any lab work today      She also has hyperlipidemia. She was started on Zocor previously. She did develop some myalgias. She stopped the medication. She does not want medication for lipids at this point. .  She does not want additional testing for her lipids at this point, Based on current cardiovascular risk of 4.8, statins are not indicated at this time    She has been treated for left breast cancer. She has had surgery chemo and radiation. She is now on hormonal therapy. She is felt to be free of disease and follows routinely with oncology. [facility] December. Patient states it was normal    Patient's oncologist did order a DEXA scan as patient is on Femara. It did reveal osteopenia. She is now on Boniva monthly from oncology    Patient has been taking over-the-counter omeprazole. This was started during her treatment for breast cancer. She has no GI complaints. She thought she was supposed to take this medication forever. She was pleased to learn that she could taper off and stop this medication. She will notify me if she develops any GERD complaints after stopping the medication    Her past medical history is otherwise negative    We again today did discuss additional cancer screens including Pap smears and colon screening. She is aware but continues to decline both. Patient does get the flu shots annually. She has had the Prevnar. She declines Pneumovax at this point. She declines shingles vaccine. She is aware they are indicated      Review of Systems:      Review of systems negative except as noted above    Physical Exam:  Visit Vitals    /73 (BP 1 Location: Right arm, BP Patient Position: Sitting)    Pulse 96    Temp 97.8 °F (36.6 °C) (Oral)    Resp 16    Ht 5' 4\" (1.626 m)    Wt 149 lb 9.6 oz (67.9 kg)    BMI 25.68 kg/m2     Vitals:    07/12/18 1328   BP: 118/73   BP 1 Location: Right arm   BP Patient Position: Sitting   Pulse: 96   Resp: 16   Temp: 97.8 °F (36.6 °C)   TempSrc: Oral   Weight: 149 lb 9.6 oz (67.9 kg)   Height: 5' 4\" (1.626 m)     Patient no acute distress vital signs on repeat as above  Patient dressed appropriately. Good eye contact. Did not appear anxious or distressed. No psychotic or suicidal ideations  Head is normocephalic  External ears normal.  Ear canals normal.  TMs were clear  Nose within normal limits  OP was within normal limits. Pharynx normal.  No obvious lesions  Neck no nodes no masses no bruits  Chest was clear no wheezes rhonchi or rales.   Good air exchange  Cor regular rate and rhythm no S3-S4 no murmurs  Abdomen no HSM no masses soft and was nontender  Extremities no edema. Nontender. Good range of motion  Gait and gross neurologic exam were normal    1. Essential hypertension  Blood pressure in good control on her current regimen. Patients can continue her current medication. Declining lab work today. She agrees to see me back in 6 months. Sooner if needed  - hydroCHLOROthiazide (HYDRODIURIL) 12.5 mg tablet; Take 1 Tab by mouth daily. Dispense: 90 Tab; Refill: 2    2. Depression, unspecified depression type  Doing well on Celexa. Will continue her current medications  - citalopram (CELEXA) 40 mg tablet; Take 1 Tab by mouth daily. Dispense: 90 Tab; Refill: 2    3. Anxiety  We will continue Celexa. Will try as needed BuSpar  - busPIRone (BUSPAR) 5 mg tablet; One daily if needed for anxiety  Dispense: 20 Tab; Refill: 1    4. Hyperlipidemia, unspecified hyperlipidemia type  Lipids minimally elevated. Patient declining medication    5. Malignant neoplasm of left breast in female, estrogen receptor positive, unspecified site of breast [facility])  Patient will follow with oncology    6. Osteopenia, unspecified location  We will continue with Boniva per oncology    7. Psychophysiological insomnia    - traZODone (DESYREL) 50 mg tablet; Take 1 Tab by mouth nightly. Dispense: 90 Tab; Refill: 2      Patient Instructions   Continue current medications  buspar if needed for anxiety  May taper off omeprazole    Work on diet and exercise    Lab work    Keep planned follow up visit           Continue current therapy plan except for indicated above. Verbal and written instructions (see AVS) provided.  Patient expresses understanding of diagnosis and treatment plan. Follow-up Disposition:  Return in about 6 months (around 1/12/2019). Dara Keenan.  Jasmina Hunter MD

## 2018-07-12 NOTE — PATIENT INSTRUCTIONS
Continue current medications  buspar if needed for anxiety  May taper off omeprazole    Work on diet and exercise    Lab work    Keep planned follow up visit

## 2018-07-12 NOTE — PROGRESS NOTES
Chief Complaint   Patient presents with    Hypertension     6 month f/u     Health Maintenance Due   Topic Date Due    ZOSTER VACCINE AGE 60>  01/27/2015    Pneumococcal 19-64 Highest Risk (2 of 3 - PPSV23) 05/29/2017    FOBT Q 1 YEAR AGE 50-75  06/30/2017    PAP AKA CERVICAL CYTOLOGY  07/16/2018     1. Have you been to the ER, urgent care clinic since your last visit? Hospitalized since your last visit? No    2. Have you seen or consulted any other health care providers outside of the [address] [place] [address] since your last visit? Include any pap smears or colon screening. Yes,Oncology seen in [facility].   Lucy Zhao LPN

## 2018-08-10 DIAGNOSIS — F32.A DEPRESSION, UNSPECIFIED DEPRESSION TYPE: ICD-10-CM

## 2018-08-10 DIAGNOSIS — F51.04 PSYCHOPHYSIOLOGICAL INSOMNIA: ICD-10-CM

## 2018-08-10 DIAGNOSIS — I10 ESSENTIAL HYPERTENSION: ICD-10-CM

## 2018-08-11 RX ORDER — CITALOPRAM 40 MG/1
40 TABLET, FILM COATED ORAL DAILY
Qty: 90 TAB | Refills: 1 | Status: SHIPPED | OUTPATIENT
Start: 2018-08-11 | End: 2019-01-10 | Stop reason: SDUPTHER

## 2018-08-11 RX ORDER — HYDROCHLOROTHIAZIDE 12.5 MG/1
12.5 TABLET ORAL DAILY
Qty: 90 TAB | Refills: 1 | Status: SHIPPED | OUTPATIENT
Start: 2018-08-11 | End: 2019-01-10 | Stop reason: SDUPTHER

## 2018-08-11 RX ORDER — TRAZODONE HYDROCHLORIDE 50 MG/1
50 TABLET ORAL
Qty: 90 TAB | Refills: 1 | Status: SHIPPED | OUTPATIENT
Start: 2018-08-11 | End: 2019-01-10 | Stop reason: SDUPTHER

## 2018-08-11 NOTE — TELEPHONE ENCOUNTER
Patient requesting refill of routine medications. Scheduled to see me back in January.   Meds refilled ×6 months

## 2019-01-10 DIAGNOSIS — F32.A DEPRESSION, UNSPECIFIED DEPRESSION TYPE: ICD-10-CM

## 2019-01-10 DIAGNOSIS — I10 ESSENTIAL HYPERTENSION: ICD-10-CM

## 2019-01-10 DIAGNOSIS — F51.04 PSYCHOPHYSIOLOGICAL INSOMNIA: ICD-10-CM

## 2019-01-10 RX ORDER — TRAZODONE HYDROCHLORIDE 50 MG/1
TABLET ORAL
Qty: 90 TAB | Refills: 0 | Status: SHIPPED | OUTPATIENT
Start: 2019-01-10 | End: 2019-04-10 | Stop reason: SDUPTHER

## 2019-01-10 RX ORDER — CITALOPRAM 40 MG/1
TABLET, FILM COATED ORAL
Qty: 90 TAB | Refills: 0 | Status: SHIPPED | OUTPATIENT
Start: 2019-01-10 | End: 2019-04-10 | Stop reason: SDUPTHER

## 2019-01-10 RX ORDER — HYDROCHLOROTHIAZIDE 12.5 MG/1
TABLET ORAL
Qty: 90 TAB | Refills: 1 | Status: SHIPPED | OUTPATIENT
Start: 2019-01-10 | End: 2019-09-26 | Stop reason: SDUPTHER

## 2019-06-13 ENCOUNTER — HOSPITAL ENCOUNTER (OUTPATIENT)
Dept: MAMMOGRAPHY | Age: 64
Discharge: HOME | End: 2019-06-13
Attending: INTERNAL MEDICINE
Payer: COMMERCIAL

## 2019-06-13 DIAGNOSIS — L03.90 CELLULITIS OF SKIN WITH LYMPHANGITIS: ICD-10-CM

## 2019-06-13 DIAGNOSIS — C50.212 MALIGNANT NEOPLASM OF UPPER-INNER QUADRANT OF LEFT FEMALE BREAST (HCC): ICD-10-CM

## 2019-06-13 DIAGNOSIS — Z79.811 USE OF AROMATASE INHIBITORS: ICD-10-CM

## 2019-06-13 DIAGNOSIS — L27.0 DERMATITIS MEDICAMENTOSA DUE TO INGESTED DRUGS: ICD-10-CM

## 2019-06-13 PROCEDURE — 77062 BREAST TOMOSYNTHESIS BI: CPT

## 2020-06-29 ENCOUNTER — HOSPITAL ENCOUNTER (OUTPATIENT)
Dept: MAMMOGRAPHY | Age: 65
Discharge: HOME | End: 2020-06-29
Attending: INTERNAL MEDICINE
Payer: MEDICARE

## 2020-06-29 DIAGNOSIS — Z12.31 VISIT FOR SCREENING MAMMOGRAM: ICD-10-CM

## 2020-06-29 PROCEDURE — 77062 BREAST TOMOSYNTHESIS BI: CPT

## 2020-11-27 DIAGNOSIS — F32.A DEPRESSION, UNSPECIFIED DEPRESSION TYPE: ICD-10-CM

## 2020-11-27 DIAGNOSIS — F51.04 PSYCHOPHYSIOLOGICAL INSOMNIA: ICD-10-CM

## 2020-11-27 DIAGNOSIS — I10 ESSENTIAL HYPERTENSION: ICD-10-CM

## 2020-11-27 RX ORDER — CITALOPRAM 40 MG/1
TABLET, FILM COATED ORAL
Qty: 30 TAB | Refills: 0 | Status: SHIPPED | OUTPATIENT
Start: 2020-11-27 | End: 2020-12-15 | Stop reason: SDUPTHER

## 2020-11-27 RX ORDER — TRAZODONE HYDROCHLORIDE 50 MG/1
TABLET ORAL
Qty: 30 TAB | Refills: 0 | Status: SHIPPED | OUTPATIENT
Start: 2020-11-27 | End: 2020-12-15 | Stop reason: SDUPTHER

## 2020-11-27 RX ORDER — HYDROCHLOROTHIAZIDE 12.5 MG/1
TABLET ORAL
Qty: 30 TAB | Refills: 0 | Status: SHIPPED | OUTPATIENT
Start: 2020-11-27 | End: 2020-12-15 | Stop reason: SDUPTHER

## 2021-04-09 ENCOUNTER — TRANSCRIBE ORDER (OUTPATIENT)
Dept: SCHEDULING | Age: 66
End: 2021-04-09

## 2021-04-09 DIAGNOSIS — Z12.31 SCREENING MAMMOGRAM FOR HIGH-RISK PATIENT: Primary | ICD-10-CM

## 2021-04-23 ENCOUNTER — VIRTUAL VISIT (OUTPATIENT)
Dept: FAMILY MEDICINE CLINIC | Age: 66
End: 2021-04-23
Payer: MEDICARE

## 2021-04-23 DIAGNOSIS — R10.13 EPIGASTRIC PAIN: Primary | ICD-10-CM

## 2021-04-23 PROCEDURE — 99213 OFFICE O/P EST LOW 20 MIN: CPT | Performed by: FAMILY MEDICINE

## 2021-04-23 PROCEDURE — G8756 NO BP MEASURE DOC: HCPCS | Performed by: FAMILY MEDICINE

## 2021-04-23 PROCEDURE — G9899 SCRN MAM PERF RSLTS DOC: HCPCS | Performed by: FAMILY MEDICINE

## 2021-04-23 PROCEDURE — G8427 DOCREV CUR MEDS BY ELIG CLIN: HCPCS | Performed by: FAMILY MEDICINE

## 2021-04-23 PROCEDURE — 1101F PT FALLS ASSESS-DOCD LE1/YR: CPT | Performed by: FAMILY MEDICINE

## 2021-04-23 PROCEDURE — G8399 PT W/DXA RESULTS DOCUMENT: HCPCS | Performed by: FAMILY MEDICINE

## 2021-04-23 PROCEDURE — 3017F COLORECTAL CA SCREEN DOC REV: CPT | Performed by: FAMILY MEDICINE

## 2021-04-23 PROCEDURE — 1090F PRES/ABSN URINE INCON ASSESS: CPT | Performed by: FAMILY MEDICINE

## 2021-04-23 PROCEDURE — G9717 DOC PT DX DEP/BP F/U NT REQ: HCPCS | Performed by: FAMILY MEDICINE

## 2021-04-23 NOTE — PROGRESS NOTES
Kirill Prakash is a 77 y.o. female who was seen by synchronous (real-time) audio-video technology on 4/23/2021 for Abdominal Pain (started at 1:00am ), Vomiting (x 1 occurance (no blood) ), and Shortness of Breath (x 2 weeks )        Assessment & Plan:   Diagnoses and all orders for this visit:    1. Epigastric pain        Observation as long as not worsening and able to eat and drink. Stop ibuprofen and use only APAP for pain. ED for worsening sxs. Subjective: Woke in middle of night with sharp epigastric pain without radiation. Emesis x 1 this am. Drinking OK. Has been taking ibuprofen daily for a couple of weeks. No fever, diarrhea, melena or heatochezia. Prior to Admission medications    Medication Sig Start Date End Date Taking? Authorizing Provider   ZINC PO Take  by mouth. Yes Provider, Historical   CALCIUM PO Take  by mouth. Yes Provider, Historical   citalopram (CELEXA) 40 mg tablet TAKE 1 TABLET DAILY 12/15/20  Yes Maria Luisa Kaiser MD   hydroCHLOROthiazide (HYDRODIURIL) 12.5 mg tablet TAKE 1 TABLET DAILY 12/15/20  Yes Maria Luisa Kaiser MD   traZODone (DESYREL) 50 mg tablet TAKE 1 TABLET NIGHTLY 12/15/20  Yes Maria Luisa Kaiser MD   ibandronate (BONIVA) 150 mg tablet  6/22/18  Yes Provider, Historical   red yeast rice extract 600 mg cap Take 600 mg by mouth now. Yes Provider, Historical   letrozole [facility]) 2.5 mg tablet  3/20/17  Yes Provider, Historical   metroNIDAZOLE (METROGEL) 0.75 % topical gel Apply  to affected area two (2) times a day. 3/23/16  Yes Provider, Historical   multivitamin (ONE A DAY) tablet Take 1 Tab by mouth daily. Yes Provider, Historical   LORazepam (ATIVAN) 0.5 mg tablet Take 1 Tab by mouth nightly as needed for Anxiety.  Max Daily Amount: 0.5 mg. 9/26/19   Cayla Lindquist PA-C         ROS    Objective:     Patient-Reported Vitals 4/23/2021   Patient-Reported Temperature -   Patient-Reported Systolic  319   Patient-Reported Diastolic 87 [INSTRUCTIONS:  \"[x]\" Indicates a positive item  \"[]\" Indicates a negative item  -- DELETE ALL ITEMS NOT EXAMINED]    Constitutional: [x] Appears well-developed and well-nourished [x] No apparent distress      [] Abnormal -     Mental status: [x] Alert and awake  [x] Oriented to person/place/time [x] Able to follow commands    [] Abnormal -     Eyes:   EOM    [x]  Normal    [] Abnormal -   Sclera  [x]  Normal    [] Abnormal -          Discharge [x]  None visible   [] Abnormal -     HENT: [x] Normocephalic, atraumatic  [] Abnormal -   [x] Mouth/Throat: Mucous membranes are moist    External Ears [x] Normal  [] Abnormal -    Neck: [x] No visualized mass [] Abnormal -     Pulmonary/Chest: [x] Respiratory effort normal   [x] No visualized signs of difficulty breathing or respiratory distress        [] Abnormal -      Musculoskeletal:   [x] Normal gait with no signs of ataxia         [x] Normal range of motion of neck        [] Abnormal -     Neurological:        [x] No Facial Asymmetry (Cranial nerve 7 motor function) (limited exam due to video visit)          [x] No gaze palsy        [] Abnormal -          Skin:        [x] No significant exanthematous lesions or discoloration noted on facial skin         [] Abnormal -            Psychiatric:       [x] Normal Affect [] Abnormal -        [x] No Hallucinations    Other pertinent observable physical exam findings:-        We discussed the expected course, resolution and complications of the diagnosis(es) in detail. Medication risks, benefits, costs, interactions, and alternatives were discussed as indicated. I advised her to contact the office if her condition worsens, changes or fails to improve as anticipated. She expressed understanding with the diagnosis(es) and plan. Meagan Sheppard, was evaluated through a synchronous (real-time) audio-video encounter. The patient (or guardian if applicable) is aware that this is a billable service.  Verbal consent to proceed has been obtained within the past 12 months. The visit was conducted pursuant to the emergency declaration under the [address] [place] [address] and the [employer] General Act. Patient identification was verified, and a caregiver was present when appropriate. The patient was located in a state where the provider was credentialed to provide care.       Iza Branch MD

## 2021-04-23 NOTE — PROGRESS NOTES
1. Have you been to the ER, urgent care clinic since your last visit? Hospitalized since your last visit? No    2. Have you seen or consulted any other health care providers outside of the [address] [place] [address] since your last visit? Include any pap smears or colon screening.  No

## 2021-04-24 ENCOUNTER — PATIENT MESSAGE (OUTPATIENT)
Dept: FAMILY MEDICINE CLINIC | Age: 66
End: 2021-04-24

## 2021-04-26 RX ORDER — ALBUTEROL SULFATE 90 UG/1
2 AEROSOL, METERED RESPIRATORY (INHALATION)
Qty: 1 INHALER | Refills: 1 | Status: SHIPPED | OUTPATIENT
Start: 2021-04-26

## 2021-04-26 NOTE — TELEPHONE ENCOUNTER
From: Tonny Kline  To: Reynaldo Sanders MD  Sent: 4/24/2021 2:20 PM EDT  Subject: Prescription Question    When we talked the other day I asked about an inhaler but forgot to follow up at the end. Can to phone a prescription in to [facility]?

## 2021-05-24 DIAGNOSIS — I10 ESSENTIAL HYPERTENSION: ICD-10-CM

## 2021-05-24 DIAGNOSIS — F32.A DEPRESSION, UNSPECIFIED DEPRESSION TYPE: ICD-10-CM

## 2021-05-24 DIAGNOSIS — F51.04 PSYCHOPHYSIOLOGICAL INSOMNIA: ICD-10-CM

## 2021-05-24 RX ORDER — CITALOPRAM 40 MG/1
TABLET, FILM COATED ORAL
Qty: 30 TABLET | Refills: 0 | Status: SHIPPED | OUTPATIENT
Start: 2021-05-24 | End: 2021-05-25

## 2021-05-24 RX ORDER — TRAZODONE HYDROCHLORIDE 50 MG/1
TABLET ORAL
Qty: 90 TABLET | Refills: 1 | Status: SHIPPED | OUTPATIENT
Start: 2021-05-24 | End: 2021-11-24 | Stop reason: SDUPTHER

## 2021-05-24 RX ORDER — HYDROCHLOROTHIAZIDE 12.5 MG/1
TABLET ORAL
Qty: 90 TABLET | Refills: 1 | Status: SHIPPED | OUTPATIENT
Start: 2021-05-24 | End: 2021-11-24 | Stop reason: SDUPTHER

## 2021-05-25 ENCOUNTER — VIRTUAL VISIT (OUTPATIENT)
Dept: FAMILY MEDICINE CLINIC | Age: 66
End: 2021-05-25
Payer: MEDICARE

## 2021-05-25 DIAGNOSIS — F32.A DEPRESSION, UNSPECIFIED DEPRESSION TYPE: ICD-10-CM

## 2021-05-25 DIAGNOSIS — E78.5 HYPERLIPIDEMIA, UNSPECIFIED HYPERLIPIDEMIA TYPE: ICD-10-CM

## 2021-05-25 DIAGNOSIS — I10 ESSENTIAL HYPERTENSION: Primary | ICD-10-CM

## 2021-05-25 DIAGNOSIS — C50.212 MALIGNANT NEOPLASM OF UPPER-INNER QUADRANT OF LEFT FEMALE BREAST, UNSPECIFIED ESTROGEN RECEPTOR STATUS (HCC): ICD-10-CM

## 2021-05-25 DIAGNOSIS — F33.9 RECURRENT DEPRESSION (HCC): ICD-10-CM

## 2021-05-25 PROCEDURE — 3017F COLORECTAL CA SCREEN DOC REV: CPT | Performed by: FAMILY MEDICINE

## 2021-05-25 PROCEDURE — 99213 OFFICE O/P EST LOW 20 MIN: CPT | Performed by: FAMILY MEDICINE

## 2021-05-25 PROCEDURE — 1090F PRES/ABSN URINE INCON ASSESS: CPT | Performed by: FAMILY MEDICINE

## 2021-05-25 PROCEDURE — G8399 PT W/DXA RESULTS DOCUMENT: HCPCS | Performed by: FAMILY MEDICINE

## 2021-05-25 PROCEDURE — G9899 SCRN MAM PERF RSLTS DOC: HCPCS | Performed by: FAMILY MEDICINE

## 2021-05-25 PROCEDURE — G9717 DOC PT DX DEP/BP F/U NT REQ: HCPCS | Performed by: FAMILY MEDICINE

## 2021-05-25 PROCEDURE — G8756 NO BP MEASURE DOC: HCPCS | Performed by: FAMILY MEDICINE

## 2021-05-25 PROCEDURE — 1101F PT FALLS ASSESS-DOCD LE1/YR: CPT | Performed by: FAMILY MEDICINE

## 2021-05-25 PROCEDURE — G8427 DOCREV CUR MEDS BY ELIG CLIN: HCPCS | Performed by: FAMILY MEDICINE

## 2021-05-25 RX ORDER — CITALOPRAM 40 MG/1
40 TABLET, FILM COATED ORAL DAILY
Qty: 90 TABLET | Refills: 1 | Status: SHIPPED | OUTPATIENT
Start: 2021-05-25 | End: 2021-08-27

## 2021-05-25 NOTE — PROGRESS NOTES
1. Have you been to the ER, urgent care clinic since your last visit? Hospitalized since your last visit? No    2. Have you seen or consulted any other health care providers outside of the [address] [place] [address] since your last visit? Include any pap smears or colon screening. Nava Lee is a 77 y.o. female who was seen by synchronous (real-time) audio-video technology on 5/25/2021 for Hypertension, Depression, and Medication Refill        Assessment & Plan:   Diagnoses and all orders for this visit:    1. Essential hypertension  -     METABOLIC PANEL, COMPREHENSIVE; Future  -     CBC WITH AUTOMATED DIFF; Future    2. Depression, unspecified depression type  -     citalopram (CELEXA) 40 mg tablet; Take 1 Tablet by mouth daily. 3. Recurrent depression ([facility]    4. Malignant neoplasm of upper-inner quadrant of left female breast, unspecified estrogen receptor status ([facility]    5. Hyperlipidemia, unspecified hyperlipidemia type  -     LIPID PANEL; Future        Home monitor BP. Recheck labs next week    Subjective:      Feels well, mood is good, no problems with meds. No recent labs. Prior to Admission medications    Medication Sig Start Date End Date Taking? Authorizing Provider   citalopram (CELEXA) 40 mg tablet Take 1 Tablet by mouth daily. 5/25/21  Yes Annabelle Matthews MD   hydroCHLOROthiazide (HYDRODIURIL) 12.5 mg tablet TAKE 1 TABLET DAILY 5/24/21  Yes Annabelle Matthews MD   traZODone (DESYREL) 50 mg tablet TAKE 1 TABLET NIGHTLY 5/24/21  Yes Annabelle Matthews MD   albuterol (PROVENTIL HFA, VENTOLIN HFA, PROAIR HFA) 90 mcg/actuation inhaler Take 2 Puffs by inhalation every four (4) hours as needed for Wheezing. 4/26/21  Yes Annabelle Matthews MD   ZINC PO Take  by mouth. Yes Provider, Historical   CALCIUM PO Take  by mouth. Yes Provider, Historical   red yeast rice extract 600 mg cap Take 600 mg by mouth now.    Yes Provider, Historical   letrozole [facility]) 2.5 mg tablet  3/20/17  Yes Provider, Historical   metroNIDAZOLE (METROGEL) 0.75 % topical gel Apply  to affected area two (2) times a day. 3/23/16  Yes Provider, Historical   multivitamin (ONE A DAY) tablet Take 1 Tab by mouth daily. Yes Provider, Historical   citalopram (CELEXA) 40 mg tablet TAKE 1 TABLET DAILY 12/15/20   Alicia Brenner MD   hydroCHLOROthiazide (HYDRODIURIL) 12.5 mg tablet TAKE 1 TABLET DAILY 12/15/20   Alicia Brenner MD   traZODone (DESYREL) 50 mg tablet TAKE 1 TABLET NIGHTLY 12/15/20   Alicia Brenner MD   LORazepam (ATIVAN) 0.5 mg tablet Take 1 Tab by mouth nightly as needed for Anxiety. Max Daily Amount: 0.5 mg.   Patient not taking: Reported on 5/25/2021 9/26/19   Luana Colorado PA-C   ibandronate (BONIVA) 150 mg tablet  6/22/18   Provider, Historical         ROS    Objective:     Patient-Reported Vitals 4/23/2021   Patient-Reported Temperature -   Patient-Reported Systolic  341   Patient-Reported Diastolic 87        [INSTRUCTIONS:  \"[x]\" Indicates a positive item  \"[]\" Indicates a negative item  -- DELETE ALL ITEMS NOT EXAMINED]    Constitutional: [x] Appears well-developed and well-nourished [x] No apparent distress      [] Abnormal -     Mental status: [x] Alert and awake  [x] Oriented to person/place/time [x] Able to follow commands    [] Abnormal -     Eyes:   EOM    [x]  Normal    [] Abnormal -   Sclera  [x]  Normal    [] Abnormal -          Discharge [x]  None visible   [] Abnormal -     HENT: [x] Normocephalic, atraumatic  [] Abnormal -   [x] Mouth/Throat: Mucous membranes are moist    External Ears [x] Normal  [] Abnormal -    Neck: [x] No visualized mass [] Abnormal -     Pulmonary/Chest: [x] Respiratory effort normal   [x] No visualized signs of difficulty breathing or respiratory distress        [] Abnormal -      Musculoskeletal:   [x] Normal gait with no signs of ataxia         [x] Normal range of motion of neck        [] Abnormal -     Neurological:        [x] No Facial Asymmetry (Cranial nerve 7 motor function) (limited exam due to video visit)          [x] No gaze palsy        [] Abnormal -          Skin:        [x] No significant exanthematous lesions or discoloration noted on facial skin         [] Abnormal -            Psychiatric:       [x] Normal Affect [] Abnormal -        [x] No Hallucinations    Other pertinent observable physical exam findings:-        We discussed the expected course, resolution and complications of the diagnosis(es) in detail. Medication risks, benefits, costs, interactions, and alternatives were discussed as indicated. I advised her to contact the office if her condition worsens, changes or fails to improve as anticipated. She expressed understanding with the diagnosis(es) and plan. Coretta Lorenza, was evaluated through a synchronous (real-time) audio-video encounter. The patient (or guardian if applicable) is aware that this is a billable service. Verbal consent to proceed has been obtained within the past 12 months. The visit was conducted pursuant to the emergency declaration under the [facility] [address] authority and the [employer] [place] General Act. Patient identification was verified, and a caregiver was present when appropriate. The patient was located in a state where the provider was credentialed to provide care.       Swati Burden MD

## 2021-06-25 ENCOUNTER — LAB ONLY (OUTPATIENT)
Dept: FAMILY MEDICINE CLINIC | Age: 66
End: 2021-06-25
Payer: MEDICARE

## 2021-06-25 DIAGNOSIS — I10 ESSENTIAL HYPERTENSION: ICD-10-CM

## 2021-06-25 DIAGNOSIS — E78.5 HYPERLIPIDEMIA, UNSPECIFIED HYPERLIPIDEMIA TYPE: ICD-10-CM

## 2021-06-25 PROCEDURE — 36415 COLL VENOUS BLD VENIPUNCTURE: CPT | Performed by: FAMILY MEDICINE

## 2021-06-26 LAB
ALBUMIN SERPL-MCNC: 4.1 G/DL (ref 3.5–5)
ALBUMIN/GLOB SERPL: 1.4 {RATIO} (ref 1.1–2.2)
ALP SERPL-CCNC: 78 U/L (ref 45–117)
ALT SERPL-CCNC: 27 U/L (ref 12–78)
ANION GAP SERPL CALC-SCNC: 4 MMOL/L (ref 5–15)
AST SERPL-CCNC: 23 U/L (ref 15–37)
BASOPHILS # BLD: 0 K/UL (ref 0–0.1)
BASOPHILS NFR BLD: 1 % (ref 0–1)
BILIRUB SERPL-MCNC: 0.5 MG/DL (ref 0.2–1)
BUN SERPL-MCNC: 11 MG/DL (ref 6–20)
BUN/CREAT SERPL: 14 (ref 12–20)
CALCIUM SERPL-MCNC: 9 MG/DL (ref 8.5–10.1)
CHLORIDE SERPL-SCNC: 102 MMOL/L (ref 97–108)
CHOLEST SERPL-MCNC: 215 MG/DL
CO2 SERPL-SCNC: 31 MMOL/L (ref 21–32)
CREAT SERPL-MCNC: 0.81 MG/DL (ref 0.55–1.02)
DIFFERENTIAL METHOD BLD: NORMAL
EOSINOPHIL # BLD: 0.1 K/UL (ref 0–0.4)
EOSINOPHIL NFR BLD: 3 % (ref 0–7)
ERYTHROCYTE [DISTWIDTH] IN BLOOD BY AUTOMATED COUNT: 13.1 % (ref 11.5–14.5)
GLOBULIN SER CALC-MCNC: 3 G/DL (ref 2–4)
GLUCOSE SERPL-MCNC: 92 MG/DL (ref 65–100)
HCT VFR BLD AUTO: 41.4 % (ref 35–47)
HDLC SERPL-MCNC: 60 MG/DL
HDLC SERPL: 3.6 {RATIO} (ref 0–5)
HGB BLD-MCNC: 13.5 G/DL (ref 11.5–16)
IMM GRANULOCYTES # BLD AUTO: 0 K/UL (ref 0–0.04)
IMM GRANULOCYTES NFR BLD AUTO: 0 % (ref 0–0.5)
LDLC SERPL CALC-MCNC: 138.2 MG/DL (ref 0–100)
LYMPHOCYTES # BLD: 1 K/UL (ref 0.8–3.5)
LYMPHOCYTES NFR BLD: 24 % (ref 12–49)
MCH RBC QN AUTO: 30.7 PG (ref 26–34)
MCHC RBC AUTO-ENTMCNC: 32.6 G/DL (ref 30–36.5)
MCV RBC AUTO: 94.1 FL (ref 80–99)
MONOCYTES # BLD: 0.5 K/UL (ref 0–1)
MONOCYTES NFR BLD: 11 % (ref 5–13)
NEUTS SEG # BLD: 2.4 K/UL (ref 1.8–8)
NEUTS SEG NFR BLD: 61 % (ref 32–75)
NRBC # BLD: 0 K/UL (ref 0–0.01)
NRBC BLD-RTO: 0 PER 100 WBC
PLATELET # BLD AUTO: 181 K/UL (ref 150–400)
PMV BLD AUTO: 9.8 FL (ref 8.9–12.9)
POTASSIUM SERPL-SCNC: 3.9 MMOL/L (ref 3.5–5.1)
PROT SERPL-MCNC: 7.1 G/DL (ref 6.4–8.2)
RBC # BLD AUTO: 4.4 M/UL (ref 3.8–5.2)
SODIUM SERPL-SCNC: 137 MMOL/L (ref 136–145)
TRIGL SERPL-MCNC: 84 MG/DL (ref ?–150)
VLDLC SERPL CALC-MCNC: 16.8 MG/DL
WBC # BLD AUTO: 4 K/UL (ref 3.6–11)

## 2021-06-28 NOTE — PROGRESS NOTES
Labs come back in good range. Cholesterol comes back somewhat high again and with your age and risk factor of hypertension it does increase your risk of heart attack or stroke over the next ten years. It is calculated at 9.7% Treatment of your cholesterol with a statin lowers this risk to 4.5% over the same period. Guidelines suggest offering a statin to anyone with a ten year risk > 10%, so you are right there, and the risk reduction with treatment is significant. Let me know if you are interested in starting medication.

## 2021-08-03 PROBLEM — E78.5 HYPERLIPEMIA: Status: RESOLVED | Noted: 2021-08-03 | Resolved: 2021-08-03

## 2021-09-16 ENCOUNTER — HOSPITAL ENCOUNTER (OUTPATIENT)
Dept: MAMMOGRAPHY | Age: 66
Discharge: HOME | End: 2021-09-16
Attending: INTERNAL MEDICINE
Payer: MEDICARE

## 2021-09-16 DIAGNOSIS — C50.919 BREAST CANCER (HCC): ICD-10-CM

## 2021-09-16 DIAGNOSIS — Z12.31 SCREENING MAMMOGRAM FOR HIGH-RISK PATIENT: ICD-10-CM

## 2021-09-16 PROCEDURE — 77066 DX MAMMO INCL CAD BI: CPT

## 2021-09-16 PROCEDURE — 77067 SCR MAMMO BI INCL CAD: CPT

## 2021-09-17 ENCOUNTER — HOSPITAL ENCOUNTER (OUTPATIENT)
Dept: ULTRASOUND IMAGING | Age: 66
Discharge: HOME | End: 2021-09-17
Attending: INTERNAL MEDICINE
Payer: MEDICARE

## 2021-09-17 DIAGNOSIS — R92.8 ABNORMAL MAMMOGRAM: ICD-10-CM

## 2021-09-17 PROCEDURE — 76642 ULTRASOUND BREAST LIMITED: CPT

## 2021-10-05 ENCOUNTER — HOSPITAL ENCOUNTER (OUTPATIENT)
Dept: LAB | Age: 66
Discharge: HOME | End: 2021-10-05

## 2021-10-05 ENCOUNTER — OFFICE VISIT (OUTPATIENT)
Dept: SURGERY | Age: 66
End: 2021-10-05
Payer: MEDICARE

## 2021-10-05 VITALS
TEMPERATURE: 97.5 F | BODY MASS INDEX: 24.24 KG/M2 | OXYGEN SATURATION: 98 % | SYSTOLIC BLOOD PRESSURE: 126 MMHG | HEIGHT: 64 IN | RESPIRATION RATE: 16 BRPM | DIASTOLIC BLOOD PRESSURE: 76 MMHG | WEIGHT: 142 LBS | HEART RATE: 84 BPM

## 2021-10-05 DIAGNOSIS — Z17.0 MALIGNANT NEOPLASM OF LOWER-INNER QUADRANT OF LEFT BREAST IN FEMALE, ESTROGEN RECEPTOR POSITIVE (HCC): Primary | ICD-10-CM

## 2021-10-05 DIAGNOSIS — R92.8 ABNORMAL MAMMOGRAM OF LEFT BREAST: ICD-10-CM

## 2021-10-05 DIAGNOSIS — C50.312 MALIGNANT NEOPLASM OF LOWER-INNER QUADRANT OF LEFT BREAST IN FEMALE, ESTROGEN RECEPTOR POSITIVE (HCC): Primary | ICD-10-CM

## 2021-10-05 PROCEDURE — G8536 NO DOC ELDER MAL SCRN: HCPCS | Performed by: SURGERY

## 2021-10-05 PROCEDURE — G9717 DOC PT DX DEP/BP F/U NT REQ: HCPCS | Performed by: SURGERY

## 2021-10-05 PROCEDURE — G8752 SYS BP LESS 140: HCPCS | Performed by: SURGERY

## 2021-10-05 PROCEDURE — G8754 DIAS BP LESS 90: HCPCS | Performed by: SURGERY

## 2021-10-05 PROCEDURE — G8399 PT W/DXA RESULTS DOCUMENT: HCPCS | Performed by: SURGERY

## 2021-10-05 PROCEDURE — G8427 DOCREV CUR MEDS BY ELIG CLIN: HCPCS | Performed by: SURGERY

## 2021-10-05 PROCEDURE — 19083 BX BREAST 1ST LESION US IMAG: CPT | Performed by: SURGERY

## 2021-10-05 PROCEDURE — 1090F PRES/ABSN URINE INCON ASSESS: CPT | Performed by: SURGERY

## 2021-10-05 PROCEDURE — G9899 SCRN MAM PERF RSLTS DOC: HCPCS | Performed by: SURGERY

## 2021-10-05 PROCEDURE — G8420 CALC BMI NORM PARAMETERS: HCPCS | Performed by: SURGERY

## 2021-10-05 PROCEDURE — 1101F PT FALLS ASSESS-DOCD LE1/YR: CPT | Performed by: SURGERY

## 2021-10-05 PROCEDURE — 3017F COLORECTAL CA SCREEN DOC REV: CPT | Performed by: SURGERY

## 2021-10-05 PROCEDURE — 99205 OFFICE O/P NEW HI 60 MIN: CPT | Performed by: SURGERY

## 2021-10-05 RX ORDER — LIDOCAINE HYDROCHLORIDE 10 MG/ML
10 INJECTION, SOLUTION EPIDURAL; INFILTRATION; INTRACAUDAL; PERINEURAL ONCE
Qty: 20 ML | Refills: 0
Start: 2021-10-05 | End: 2021-10-05

## 2021-10-05 NOTE — PROGRESS NOTES
HISTORY OF PRESENT ILLNESS  Gabe Gutierrez is a 77 y.o. female who comes in for consultation for an abnormal mammogram and hx breast cancer  Rash   Pertinent negatives include no itching. Breast Cancer  Pertinent negatives include no chest pain, no abdominal pain, no headaches and no shortness of breath. Follow-up  Pertinent negatives include no chest pain, no abdominal pain, no headaches and no shortness of breath. Breast Problem  Pertinent negatives include no chest pain, no abdominal pain, no headaches and no shortness of breath. She went for screening mammogram subsequently at left dx mammogram and US 2016 at [facility] and was noted to have a 2.9 x 2.1 x 2.6 cm hypoechoic mass at 0900 3 CFN in the left breast.   Prior to this mammogram she had not had a mammogram for over 20 years and felt it was not likely that anything was present. She denies having felt a mass. US core bx 2016 demonstrated IDC G3 ER 90% WA 30-40% her2/rachell 2+ unamplified by FISH. She denies prior breast problems, masses, skin changes, nipple changes or drainage, unexplained weight loss or bone pain. She had menarche at 15, is nulliparous, had menopause at 52 and did not take HRT. She denies family hx of breast or ovarian cancer. Her father  of liver cancer (heavy drinker). Breast MRI suggested only the known disease. She underwent a left lumpectomy and SLNB for a T2N0M0 IDC. Mammaprint suggested high risk. She received chemotherapy with Dr David Osborn and WBRT by Dr Kimber Salcedo and has been taking letrozole. She had a mammogram and US 2021 demonstrating a new area of coarse calcifications with a 6.7 x 5.5 x 5.0 mm hypoechoic area at 12-1 o'clock 7-8 cm from the nipple.        Past Medical History:   Diagnosis Date    Breast CA ([address]     L breast    Depression     Hyperlipemia      Past Surgical History:   Procedure Laterality Date    HX BREAST LUMPECTOMY Left 2016    LEFT BREAST LUMPECTOMY WITH ULTRASOUND GUIDANCE/SENTINEL NODE BIOPSY performed [facility]    HX OTHER SURGICAL  07/18/2016    Insertion of right subclavian power port     Family History   Problem Relation Age of Onset    Alcohol abuse Mother     Cancer Father         liver    Alcohol abuse Father     No Known Problems Brother     Breast Cancer Paternal Aunt      Social History     Tobacco Use    Smoking status: Never Smoker    Smokeless tobacco: Never Used   Vaping Use    Vaping Use: Never used   Substance Use Topics    Alcohol use: Yes     Alcohol/week: 4.0 standard drinks     Types: 4 Standard drinks or equivalent per week     Comment: occassionally    Drug use: No     Current Outpatient Medications   Medication Sig    citalopram (CELEXA) 40 mg tablet TAKE 1 TABLET DAILY    hydroCHLOROthiazide (HYDRODIURIL) 12.5 mg tablet TAKE 1 TABLET DAILY    traZODone (DESYREL) 50 mg tablet TAKE 1 TABLET NIGHTLY    albuterol (PROVENTIL HFA, VENTOLIN HFA, PROAIR HFA) 90 mcg/actuation inhaler Take 2 Puffs by inhalation every four (4) hours as needed for Wheezing.  ZINC PO Take  by mouth.  CALCIUM PO Take  by mouth.  LORazepam (ATIVAN) 0.5 mg tablet Take 1 Tab by mouth nightly as needed for Anxiety. Max Daily Amount: 0.5 mg. (Patient not taking: Reported on 5/25/2021)    ibandronate (BONIVA) 150 mg tablet     red yeast rice extract 600 mg cap Take 600 mg by mouth now.  letrozole (FEMARA) 2.5 mg tablet     metroNIDAZOLE (METROGEL) 0.75 % topical gel Apply  to affected area two (2) times a day.  multivitamin (ONE A DAY) tablet Take 1 Tab by mouth daily. No current facility-administered medications for this visit. No Known Allergies    Review of Systems   Constitutional: Negative for chills, diaphoresis, fever, malaise/fatigue and weight loss. HENT: Negative for congestion, ear pain and sore throat. Eyes: Negative for blurred vision and pain. Respiratory: Negative for cough, hemoptysis, sputum production, shortness of breath, wheezing and stridor. Cardiovascular: Negative for chest pain, palpitations, orthopnea, claudication, leg swelling and PND. Gastrointestinal: Negative for abdominal pain, blood in stool, constipation, diarrhea, heartburn, melena, nausea and vomiting. Genitourinary: Negative for dysuria, flank pain, frequency, hematuria and urgency. Musculoskeletal: Negative for back pain, joint pain, myalgias and neck pain. Skin: Positive for rash. Negative for itching. Neurological: Negative for dizziness, tremors, focal weakness, seizures, weakness and headaches. Endo/Heme/Allergies: Negative for polydipsia. Psychiatric/Behavioral: Negative for depression and memory loss. The patient is not nervous/anxious. Visit Vitals  /76 (BP 1 Location: Left upper arm, BP Patient Position: Sitting, BP Cuff Size: Adult)   Pulse 84   Temp 97.5 °F (36.4 °C) (Temporal)   Resp 16   Ht 5' 4\" (1.626 m)   Wt 64.4 kg (142 lb)   SpO2 98%   BMI 24.37 kg/m²       Physical Exam  Exam conducted with a chaperone present. Constitutional:       General: She is not in acute distress. Appearance: She is well-developed. She is not diaphoretic. HENT:      Head: Normocephalic and atraumatic. Mouth/Throat:      Pharynx: No oropharyngeal exudate. Eyes:      General: No scleral icterus. Conjunctiva/sclera: Conjunctivae normal.      Pupils: Pupils are equal, round, and reactive to light. Neck:      Thyroid: No thyromegaly. Vascular: No JVD. Trachea: No tracheal deviation. Cardiovascular:      Rate and Rhythm: Normal rate and regular rhythm. Heart sounds: No murmur heard. No friction rub. No gallop. Pulmonary:      Effort: Pulmonary effort is normal. No respiratory distress. Breath sounds: Normal breath sounds. No wheezing or rales. Chest:      Breasts: Breasts are symmetrical (medium).          Right: Tenderness present. No inverted nipple, mass, nipple discharge or skin change. Left: Tenderness present. No inverted nipple, mass, nipple discharge or skin change. Abdominal:      General: Bowel sounds are normal. There is no distension. Palpations: Abdomen is soft. There is no mass. Tenderness: There is no abdominal tenderness. There is no guarding or rebound. Musculoskeletal:         General: Normal range of motion. Cervical back: Normal range of motion and neck supple. Lymphadenopathy:      Cervical: No cervical adenopathy. Upper Body:      Right upper body: No supraclavicular or axillary adenopathy. Left upper body: No supraclavicular or axillary adenopathy. Skin:     General: Skin is warm and dry. Coloration: Skin is not pale. Findings: Rash (rash on arms, trunk) present. No erythema. Neurological:      Mental Status: She is alert and oriented to person, place, and time. Cranial Nerves: No cranial nerve deficit. Psychiatric:         Behavior: Behavior normal.         Thought Content: Thought content normal.         Judgment: Judgment normal.         I reviewed her mammogram and US images    ASSESSMENT and PLAN  1. Left breast ca stage 2 (T2N0M0) G3 IDC Er/TN pos, her2/rachell negative. Dx 4/2016  AG at  5 years and 6 months  Finished adjuvant chemotherapy and WBRT  Continue letrozole per Dr Isamar Ghosh  Keep f/u with Catalina Ghosh and Cecile Lewis    2. Had J and J Covid vaccination  3. New abnormal mammogram and US left breast in [facility]. I explained to her about the anatomy and pathophysiology of the process and risk for malignancy. I discussed US core biopsy and risks including, but not limited to, bleeding, infection, need for additional biopsy/surgery.      She desires US core biopsy and we did that while she was here today    RTC 5 days      Katarina Sapp MD FACS  ;

## 2021-10-05 NOTE — PROGRESS NOTES
Identified pt with two pt identifiers(name and ). Reviewed record in preparation for visit and have obtained necessary documentation. All patient medications has been reviewed. Chief Complaint   Patient presents with    Breast Problem     21 mammogram completed recommended breast bx. Health Maintenance Due   Topic    Shingrix Vaccine Age 49> (1 of 2)    Colorectal Cancer Screening Combo     Flu Vaccine (1)       Vitals:    10/05/21 1047   BP: 126/76   Pulse: 84   Resp: 16   Temp: 97.5 °F (36.4 °C)   TempSrc: Temporal   SpO2: 98%   Weight: 64.4 kg (142 lb)   Height: 5' 4\" (1.626 m)   PainSc:   0 - No pain       4. Have you been to the ER, urgent care clinic since your last visit? Hospitalized since your last visit? No    5. Have you seen or consulted any other health care providers outside of the [address] [place] [address] since your last visit? Include any pap smears or colon screening. No      Patient is accompanied by self I have received verbal consent from Trudi Nephjose to discuss any/all medical information while they are present in the room.   t

## 2021-10-05 NOTE — PROGRESS NOTES
Procedure Note    Pre Procedure Diagnosis:  Left breast lesion at 0100 7 cm from nipple  Post Procedure Diagnosis:  Left breast lesion at 0100 7 cm from nipple  Procedure:  1. Ultrasound guided vacuum assisted core biopsy of left breast lesion                      2.  Ultrasound guided marker/clip placement left breast  Surgeon:   Abril Fermin MD FACS  Local 10 ml 1% lidocaine with epi  EBL minimal  SPECIMEN:   Left breast mass    Procedure:  After informed consent and time out, the left breast was prepped with chlorhexidine. Using ultrasound guidance, local anesthesia was injected into the dermis and subcutaneous tissues adjacent to the density noted on ultrasound. A small stab incision was made and using an 8 gauge Mammotome vacuum assisted core biopsy device and ultrasound guidance 4 cores were taken from a lateral to medial approach. Pictures were taken to document this procedure. Next, again using ultrasound guidance a HydroMark 8 clip was placed in the biopsy cavity. Pressure was held and then steristrips and a sterile dressing was applied. The patient tolerated the procedure well.       Signed  Abril Fermin MD FACS

## 2021-10-05 NOTE — PROGRESS NOTES
[facility]  OFFICE PROCEDURE PROGRESS NOTE        Chart reviewed for the following:   Kane GABRIEL, have reviewed the History, Physical and updated the Allergic reactions for [address] performed immediately prior to start of procedure:   Kane GABRIEL, have performed the following reviews on [address] prior to the start of the procedure:            * Patient was identified by name and date of birth   * Agreement on procedure being performed was verified  * Risks and Benefits explained to the patient  * Procedure site verified and marked as necessary  * Patient was positioned for comfort  * Consent was signed and verified     Time: 9307      Date of procedure: 10/5/2021    Procedure performed by:  Noe Stoll MD    Provider assisted by: Kane Giles LPN    Patient assisted by: Self    How tolerated by patient: tolerated well     Post Procedural Pain Scale: 0    Comments: Specimen left breast sent to pathology

## 2021-10-11 ENCOUNTER — TELEPHONE (OUTPATIENT)
Dept: SURGERY | Age: 66
End: 2021-10-11

## 2021-10-11 DIAGNOSIS — R92.8 ABNORMAL MAMMOGRAM OF LEFT BREAST: Primary | ICD-10-CM

## 2021-10-11 NOTE — TELEPHONE ENCOUNTER
Path    Left breast; needle biopsy:        Fat necrosis, reactive fibrosis, elastosis and dystrophic   calcifications; no carcinoma identified; (see comment). Comment     Immunohistochemical staining with pancytokeratin AE1/AE3 and ER performed   on tissue blocks 1A and 1B highlights few bland ductal epithelial cells   with no evidence of carcinoma.        She needs a left dx mammogram to confirm that the clip is in the area of question on mammogram.      Nga  Please assist with scheduling mammogram    Vanessa Sinha MD FACS

## 2021-10-11 NOTE — TELEPHONE ENCOUNTER
Dr. Mam[facility] wants core needle biopsy results.  Please fax to [phone]

## 2021-10-12 ENCOUNTER — TRANSCRIBE ORDER (OUTPATIENT)
Dept: SCHEDULING | Age: 66
End: 2021-10-12

## 2021-10-12 DIAGNOSIS — R92.8 ABNORMAL MAMMOGRAM: Primary | ICD-10-CM

## 2021-10-21 ENCOUNTER — HOSPITAL ENCOUNTER (OUTPATIENT)
Dept: ULTRASOUND IMAGING | Age: 66
Discharge: HOME | End: 2021-10-21
Attending: SURGERY
Payer: MEDICARE

## 2021-10-21 ENCOUNTER — HOSPITAL ENCOUNTER (OUTPATIENT)
Dept: MAMMOGRAPHY | Age: 66
Discharge: HOME | End: 2021-10-21
Attending: SURGERY
Payer: MEDICARE

## 2021-10-21 DIAGNOSIS — R92.8 ABNORMAL MAMMOGRAM: ICD-10-CM

## 2021-10-21 DIAGNOSIS — R92.8 ABNORMAL MAMMOGRAM OF LEFT BREAST: ICD-10-CM

## 2021-10-21 PROCEDURE — 77061 BREAST TOMOSYNTHESIS UNI: CPT

## 2021-10-22 ENCOUNTER — TELEPHONE (OUTPATIENT)
Dept: SURGERY | Age: 66
End: 2021-10-22

## 2021-10-22 NOTE — TELEPHONE ENCOUNTER
Spoke with patient  Mammogram confirms the clip in area of concern.     RTC 6 months    Repeat mammogram 9/2022    Orlene Kehr, MD FACS

## 2021-11-24 DIAGNOSIS — F32.A DEPRESSION, UNSPECIFIED DEPRESSION TYPE: ICD-10-CM

## 2021-11-24 DIAGNOSIS — F51.04 PSYCHOPHYSIOLOGICAL INSOMNIA: ICD-10-CM

## 2021-11-24 DIAGNOSIS — I10 ESSENTIAL HYPERTENSION: ICD-10-CM

## 2021-11-24 RX ORDER — CITALOPRAM 40 MG/1
TABLET, FILM COATED ORAL
Qty: 90 TABLET | Refills: 0 | Status: SHIPPED | OUTPATIENT
Start: 2021-11-24 | End: 2022-03-01

## 2021-11-24 RX ORDER — TRAZODONE HYDROCHLORIDE 50 MG/1
50 TABLET ORAL
Qty: 90 TABLET | Refills: 0 | Status: SHIPPED | OUTPATIENT
Start: 2021-11-24 | End: 2021-12-09

## 2021-11-24 RX ORDER — HYDROCHLOROTHIAZIDE 12.5 MG/1
12.5 TABLET ORAL DAILY
Qty: 90 TABLET | Refills: 0 | Status: SHIPPED | OUTPATIENT
Start: 2021-11-24 | End: 2021-12-09

## 2021-11-24 NOTE — TELEPHONE ENCOUNTER
Requested Prescriptions     Pending Prescriptions Disp Refills    traZODone (DESYREL) 50 mg tablet 90 Tablet 1     Si Tablet nightly.  citalopram (CELEXA) 40 mg tablet 90 Tablet 1     Si Tablet daily.  hydroCHLOROthiazide (HYDRODIURIL) 12.5 mg tablet 90 Tablet 1     Si Tablet daily.

## 2022-02-08 DIAGNOSIS — F51.04 PSYCHOPHYSIOLOGICAL INSOMNIA: ICD-10-CM

## 2022-02-08 RX ORDER — TRAZODONE HYDROCHLORIDE 50 MG/1
TABLET ORAL
Qty: 90 TABLET | Refills: 3 | Status: SHIPPED | OUTPATIENT
Start: 2022-02-08 | End: 2022-04-19 | Stop reason: SDUPTHER

## 2022-02-08 NOTE — TELEPHONE ENCOUNTER
Requested Prescriptions     Pending Prescriptions Disp Refills    traZODone (DESYREL) 50 mg tablet 90 Tablet 0     Si Tablet nightly.

## 2022-02-16 NOTE — TELEPHONE ENCOUNTER
I have called and talked to this patient.   Advised her about the RX's being filled for a short supply and made her an apt to be seen on 12/15/2020 at 9:50 am.
Requested Prescriptions     Pending Prescriptions Disp Refills    citalopram (CELEXA) 40 mg tablet 90 Tab 1     Sig: TAKE 1 TABLET DAILY    hydroCHLOROthiazide (HYDRODIURIL) 12.5 mg tablet 90 Tab 0    traZODone (DESYREL) 50 mg tablet 90 Tab 0
good, to achieve stated therapy goals

## 2022-03-18 PROBLEM — F41.9 ANXIETY: Status: ACTIVE | Noted: 2017-12-07

## 2022-03-19 PROBLEM — F51.04 PSYCHOPHYSIOLOGICAL INSOMNIA: Status: ACTIVE | Noted: 2017-12-07

## 2022-03-19 PROBLEM — Z17.0 MALIGNANT NEOPLASM OF LEFT BREAST IN FEMALE, ESTROGEN RECEPTOR POSITIVE (HCC): Status: ACTIVE | Noted: 2017-12-07

## 2022-03-19 PROBLEM — M85.80 OSTEOPENIA: Status: ACTIVE | Noted: 2018-07-12

## 2022-03-19 PROBLEM — C50.912 MALIGNANT NEOPLASM OF LEFT BREAST IN FEMALE, ESTROGEN RECEPTOR POSITIVE (HCC): Status: ACTIVE | Noted: 2017-12-07

## 2022-03-19 PROBLEM — I10 ESSENTIAL HYPERTENSION: Status: ACTIVE | Noted: 2017-12-07

## 2022-03-19 PROBLEM — F33.9 RECURRENT DEPRESSION (HCC): Status: ACTIVE | Noted: 2018-01-08

## 2022-03-19 PROBLEM — R92.8 ABNORMAL MAMMOGRAM OF LEFT BREAST: Status: ACTIVE | Noted: 2021-10-05

## 2022-03-19 PROBLEM — E78.5 HYPERLIPIDEMIA: Status: ACTIVE | Noted: 2017-12-07

## 2022-04-19 DIAGNOSIS — F51.04 PSYCHOPHYSIOLOGICAL INSOMNIA: ICD-10-CM

## 2022-04-19 DIAGNOSIS — I10 ESSENTIAL HYPERTENSION: ICD-10-CM

## 2022-04-19 RX ORDER — HYDROCHLOROTHIAZIDE 12.5 MG/1
12.5 CAPSULE ORAL DAILY
Qty: 30 CAPSULE | Refills: 0 | Status: SHIPPED | OUTPATIENT
Start: 2022-04-19 | End: 2022-06-15 | Stop reason: SDUPTHER

## 2022-04-19 RX ORDER — TRAZODONE HYDROCHLORIDE 50 MG/1
TABLET ORAL
Qty: 30 TABLET | Refills: 0 | Status: SHIPPED | OUTPATIENT
Start: 2022-04-19 | End: 2022-06-13

## 2022-04-19 NOTE — TELEPHONE ENCOUNTER
Requested Prescriptions     Pending Prescriptions Disp Refills    traZODone (DESYREL) 50 mg tablet 90 Tablet 3     Sig: TAKE 1 TABLET NIGHTLY    hydroCHLOROthiazide (MICROZIDE) 12.5 mg capsule 45 Capsule 0     Sig: Take 1 Capsule by mouth daily.

## 2022-05-16 ENCOUNTER — TRANSCRIBE ORDER (OUTPATIENT)
Dept: SCHEDULING | Age: 67
End: 2022-05-16

## 2022-05-16 DIAGNOSIS — Z12.31 SCREENING MAMMOGRAM FOR HIGH-RISK PATIENT: Primary | ICD-10-CM

## 2022-06-13 DIAGNOSIS — F51.04 PSYCHOPHYSIOLOGICAL INSOMNIA: ICD-10-CM

## 2022-06-13 RX ORDER — TRAZODONE HYDROCHLORIDE 50 MG/1
TABLET ORAL
Qty: 30 TABLET | Refills: 0 | Status: SHIPPED | OUTPATIENT
Start: 2022-06-13 | End: 2022-06-14

## 2022-06-14 DIAGNOSIS — F51.04 PSYCHOPHYSIOLOGICAL INSOMNIA: ICD-10-CM

## 2022-06-14 RX ORDER — TRAZODONE HYDROCHLORIDE 50 MG/1
TABLET ORAL
Qty: 30 TABLET | Refills: 0 | Status: SHIPPED | OUTPATIENT
Start: 2022-06-14 | End: 2022-06-15 | Stop reason: SDUPTHER

## 2022-06-15 ENCOUNTER — OFFICE VISIT (OUTPATIENT)
Dept: FAMILY MEDICINE CLINIC | Age: 67
End: 2022-06-15
Payer: MEDICARE

## 2022-06-15 VITALS
RESPIRATION RATE: 16 BRPM | OXYGEN SATURATION: 94 % | SYSTOLIC BLOOD PRESSURE: 135 MMHG | DIASTOLIC BLOOD PRESSURE: 83 MMHG | WEIGHT: 147.25 LBS | TEMPERATURE: 97 F | BODY MASS INDEX: 25.14 KG/M2 | HEIGHT: 64 IN | HEART RATE: 87 BPM

## 2022-06-15 DIAGNOSIS — F51.04 PSYCHOPHYSIOLOGICAL INSOMNIA: ICD-10-CM

## 2022-06-15 DIAGNOSIS — Z00.00 MEDICARE ANNUAL WELLNESS VISIT, SUBSEQUENT: ICD-10-CM

## 2022-06-15 DIAGNOSIS — I10 ESSENTIAL HYPERTENSION: Primary | ICD-10-CM

## 2022-06-15 DIAGNOSIS — C50.312 MALIGNANT NEOPLASM OF LOWER-INNER QUADRANT OF LEFT BREAST IN FEMALE, ESTROGEN RECEPTOR POSITIVE (HCC): ICD-10-CM

## 2022-06-15 DIAGNOSIS — F32.A DEPRESSION, UNSPECIFIED DEPRESSION TYPE: ICD-10-CM

## 2022-06-15 DIAGNOSIS — F33.9 RECURRENT DEPRESSION (HCC): ICD-10-CM

## 2022-06-15 DIAGNOSIS — Z12.11 COLON CANCER SCREENING: ICD-10-CM

## 2022-06-15 DIAGNOSIS — Z17.0 MALIGNANT NEOPLASM OF LOWER-INNER QUADRANT OF LEFT BREAST IN FEMALE, ESTROGEN RECEPTOR POSITIVE (HCC): ICD-10-CM

## 2022-06-15 PROCEDURE — G0439 PPPS, SUBSEQ VISIT: HCPCS | Performed by: FAMILY MEDICINE

## 2022-06-15 PROCEDURE — 99214 OFFICE O/P EST MOD 30 MIN: CPT | Performed by: FAMILY MEDICINE

## 2022-06-15 PROCEDURE — 36415 COLL VENOUS BLD VENIPUNCTURE: CPT | Performed by: FAMILY MEDICINE

## 2022-06-15 RX ORDER — TRAZODONE HYDROCHLORIDE 50 MG/1
TABLET ORAL
Qty: 90 TABLET | Refills: 3 | Status: SHIPPED | OUTPATIENT
Start: 2022-06-15

## 2022-06-15 RX ORDER — HYDROCHLOROTHIAZIDE 12.5 MG/1
TABLET ORAL
Qty: 90 TABLET | Refills: 3 | Status: SHIPPED | OUTPATIENT
Start: 2022-06-15

## 2022-06-15 RX ORDER — CITALOPRAM 40 MG/1
TABLET, FILM COATED ORAL
Qty: 90 TABLET | Refills: 3 | Status: SHIPPED | OUTPATIENT
Start: 2022-06-15

## 2022-06-15 NOTE — PATIENT INSTRUCTIONS

## 2022-06-15 NOTE — PROGRESS NOTES
1. \"Have you been to the ER, urgent care clinic since your last visit? Hospitalized since your last visit? \" No    2. \"Have you seen or consulted any other health care providers outside of the [address] [place] [address] since your last visit? \" No     3. For patients aged 39-70: Has the patient had a colonoscopy / FIT/ Cologuard? No      If the patient is female:    4. For patients aged 41-77: Has the patient had a mammogram within the past 2 years? Yes - Care Gap present. Most recent result on file      5. For patients aged 21-65: Has the patient had a pap smear? N/A         Functional Ability:   Does the patient exhibit a steady gait? yes   How long did it take the patient to get up and walk from a sitting position? 10 seconds   Is the patient self reliant?  (ie can do own laundry, meals, household chores)  yes     Does the patient handle his/her own medications? yes     Does the patient handle his/her own money? yes     Is the patients home safe (ie good lighting, handrails on stairs and bath, etc.)? yes     Did you notice or did patient express any hearing difficulties? no     Did you notice or did patient express any vision difficulties? Wears glasses     Were distance and reading eye charts used? no       Advance Care Planning:   Patient was offered the opportunity to discuss advance care planning:  yes     Does patient have an Advance Directive:  yes   If no, did you provide information on Caring Connections?   yes     ADL Assessment 12/15/2020   Feeding yourself No Help Needed   Getting from bed to chair No Help Needed   Getting dressed No Help Needed   Bathing or showering No Help Needed   Walk across the room (includes cane/walker) No Help Needed   Using the telphone No Help Needed   Taking your medications No Help Needed   Preparing meals No Help Needed   Managing money (expenses/bills) No Help Needed   Moderately strenuous housework (laundry) No Help Needed   Shopping for personal items (toiletries/medicines) No Help Needed   Shopping for groceries No Help Needed   Driving No Help Needed   Climbing a flight of stairs No Help Needed   Getting to places beyond walking distances -       Abuse Screening Questionnaire 12/15/2020   Do you ever feel afraid of your partner? N   Are you in a relationship with someone who physically or mentally threatens you? N   Is it safe for you to go home? Y         This is the Subsequent Medicare Annual Wellness Exam, performed 12 months or more after the Initial AWV or the last Subsequent AWV    I have reviewed the patient's medical history in detail and updated the computerized patient record. Assessment/Plan   Education and counseling provided:  Are appropriate based on today's review and evaluation    1. Essential hypertension  -     CBC WITH AUTOMATED DIFF; Future  -     METABOLIC PANEL, COMPREHENSIVE; Future  2. Recurrent depression ([facility]  3. Malignant neoplasm of lower-inner quadrant of left breast in female, estrogen receptor positive ([facility]  4.  Medicare annual wellness visit, subsequent       Depression Risk Factor Screening     3 most recent PHQ Screens 12/15/2020   PHQ Not Done -   Little interest or pleasure in doing things Not at all   Feeling down, depressed, irritable, or hopeless Not at all   Total Score PHQ 2 0   Trouble falling or staying asleep, or sleeping too much -   Feeling tired or having little energy -   Poor appetite, weight loss, or overeating -   Feeling bad about yourself - or that you are a failure or have let yourself or your family down -   Trouble concentrating on things such as school, work, reading, or watching TV -   Moving or speaking so slowly that other people could have noticed; or the opposite being so fidgety that others notice -   Thoughts of being better off dead, or hurting yourself in some way -   PHQ 9 Score -   How difficult have these problems made it for you to do your work, take care of your home and get along with others -       Alcohol & Drug Abuse Risk Screen    Do you average more than 1 drink per night or more than 7 drinks a week:  No    On any one occasion in the past three months have you have had more than 3 drinks containing alcohol:  No          Functional Ability and Level of Safety    Hearing: Hearing is good. Activities of Daily Living: The home contains: no safety equipment. Patient does total self care      Ambulation: with no difficulty     Fall Risk:  Fall Risk Assessment, last 12 mths 10/5/2021   Able to walk? Yes   Fall in past 12 months? 0   Do you feel unsteady? 0   Are you worried about falling 0      Abuse Screen:  Patient is not abused       Cognitive Screening    Has your family/caregiver stated any concerns about your memory: no     Cognitive Screening: Normal - Clock Drawing Test    Health Maintenance Due     Health Maintenance Due   Topic Date Due    Shingrix Vaccine Age 49> (1 of 2) Never done    Colorectal Cancer Screening Combo  06/30/2017    COVID-19 Vaccine (2 - Booster for Navi series) 06/03/2021    Depression Monitoring  12/15/2021       Patient Care Team   Patient Care Team:  Everett Joyce MD as PCP - General (Family Medicine)  Everett Joyce MD as PCP - [facility] Empaneled Provider  Jw Ocampo MD as Surgeon (General Surgery)  Gilda Tineo MD as Physician (Hematology and Oncology)  Jennifer Espinoza MD as Physician (Radiation Oncology)    History     Patient Active Problem List   Diagnosis Code    Depression F32. A    Breast cancer of upper-outer quadrant of left female breast ([facility] C50.412    Malignant neoplasm of left breast in female, estrogen receptor positive ([facility] C50.912, Z17.0    Anxiety F41.9    Hyperlipidemia E78.5    Essential hypertension I10    Psychophysiological insomnia F51.04    Recurrent depression (HCC) F33.9    Osteopenia M85.80    Abnormal mammogram of left breast R92.8     Past Medical History:   Diagnosis Date    Breast CA ([facility] 5/16    L breast    Depression     Hyperlipemia       Past Surgical History:   Procedure Laterality Date    HX BREAST LUMPECTOMY Left 5/23/2016    LEFT BREAST LUMPECTOMY WITH ULTRASOUND GUIDANCE/SENTINEL NODE BIOPSY performed by Miguel Lopes MD at [facility] AMBULATORY OR    HX OTHER SURGICAL  07/18/2016    Insertion of right subclavian power port     Current Outpatient Medications   Medication Sig Dispense Refill    traZODone (DESYREL) 50 mg tablet TAKE ONE TABLET BY MOUTH EVERY EVENING 30 Tablet 0    hydroCHLOROthiazide (MICROZIDE) 12.5 mg capsule Take 1 Capsule by mouth daily. 30 Capsule 0    citalopram (CELEXA) 40 mg tablet TAKE ONE TABLET BY MOUTH ONCE DAILY 45 Tablet 0    hydroCHLOROthiazide (HYDRODIURIL) 12.5 mg tablet TAKE 1 TABLET DAILY 90 Tablet 0    albuterol (PROVENTIL HFA, VENTOLIN HFA, PROAIR HFA) 90 mcg/actuation inhaler Take 2 Puffs by inhalation every four (4) hours as needed for Wheezing. 1 Inhaler 1    ZINC PO Take  by mouth.  CALCIUM PO Take  by mouth.  LORazepam (ATIVAN) 0.5 mg tablet Take 1 Tab by mouth nightly as needed for Anxiety. Max Daily Amount: 0.5 mg. 8 Tab 0    ibandronate (BONIVA) 150 mg tablet       red yeast rice extract 600 mg cap Take 600 mg by mouth now.  letrozole (FEMARA) 2.5 mg tablet       metroNIDAZOLE (METROGEL) 0.75 % topical gel Apply  to affected area two (2) times a day.  multivitamin (ONE A DAY) tablet Take 1 Tab by mouth daily. No Known Allergies    Family History   Problem Relation Age of Onset    Alcohol abuse Mother     Cancer Father         liver    Alcohol abuse Father     No Known Problems Brother     Breast Cancer Paternal Aunt      Social History     Tobacco Use    Smoking status: Never Smoker    Smokeless tobacco: Never Used   Substance Use Topics    Alcohol use:  Yes     Alcohol/week: 4.0 standard drinks     Types: 4 Standard drinks or equivalent per week     Comment: occassionally         Rashmi Garcia ANDRAE Jones LPN         Successful venipuncture in patient's Left AC x 1 sticks. The patient tolerated this procedure w/o c/o pain or discomfort.

## 2022-06-15 NOTE — PROGRESS NOTES
6/15/2022      Chief Complaint   Patient presents with    Hypertension    Cholesterol Problem    Annual Wellness Visit         History of Present Illness:        Dwayne Barrett is a 79 y.o. female to follow up HTN. Feeling well, does yoga twice weekly. Mood stable on long term citalopram, trazodone very helpful for sleep. No new joint issues. Lipid panel gave her a 9% risk of cardiovascular event last year. She would like to wait a year to repeat this. BP at home has been in good range. No Known Allergies    Current Outpatient Medications   Medication Sig    citalopram (CELEXA) 40 mg tablet TAKE ONE TABLET BY MOUTH ONCE DAILY    hydroCHLOROthiazide (HYDRODIURIL) 12.5 mg tablet TAKE 1 TABLET DAILY    traZODone (DESYREL) 50 mg tablet TAKE ONE TABLET BY MOUTH EVERY EVENING    albuterol (PROVENTIL HFA, VENTOLIN HFA, PROAIR HFA) 90 mcg/actuation inhaler Take 2 Puffs by inhalation every four (4) hours as needed for Wheezing.  ZINC PO Take  by mouth.  CALCIUM PO Take  by mouth.  ibandronate (BONIVA) 150 mg tablet     red yeast rice extract 600 mg cap Take 600 mg by mouth now.  letrozole (FEMARA) 2.5 mg tablet     metroNIDAZOLE (METROGEL) 0.75 % topical gel Apply  to affected area two (2) times a day.  multivitamin (ONE A DAY) tablet Take 1 Tab by mouth daily. No current facility-administered medications for this visit. Physical Examination:    Visit Vitals  /83 (BP 1 Location: Left upper arm, BP Patient Position: Sitting, BP Cuff Size: Adult)   Pulse 87   Temp 97 °F (36.1 °C) (Oral)   Resp 16   Ht 5' 4\" (1.626 m)   Wt 147 lb 4 oz (66.8 kg)   SpO2 94%   BMI 25.28 kg/m²      General:  Alert, cooperative, no distress. HEENT:  Normocephalic, without obvious abnormality, atraumatic. Conjunctivae/corneas clear. Pupils equal, round, reactive to light. Extraocular movements intact. TMs and external canals normal bilaterally. Nasal mucosa and oropharynx clear.    Lungs: Clear to auscultation bilaterally. Chest wall:  No tenderness or deformity. Heart:  Regular rate and rhythm, S1, S2 normal, no murmur, click, rub, or gallop. Abdomen:   Soft, non-tender. Bowel sounds normal. No masses. No organomegaly. Extremities: Extremities normal, atraumatic, no cyanosis or edema. Pulses: 2+ and symmetric all extremities. Skin: Skin color, texture, turgor normal. No rashes or lesions. Lymph nodes: Cervical, supraclavicular, and axillary nodes normal.   Neurologic: CNII-XII intact. Normal strength, sensation, and reflexes throughout. ASSESSMENT AND PLAN    1. Essential hypertension  Good control. Recheck in one year  - CBC WITH AUTOMATED DIFF; Future  - METABOLIC PANEL, COMPREHENSIVE; Future  - hydroCHLOROthiazide (HYDRODIURIL) 12.5 mg tablet; TAKE 1 TABLET DAILY  Dispense: 90 Tablet; Refill: 3  - METABOLIC PANEL, COMPREHENSIVE  - CBC WITH AUTOMATED DIFF  - PA HANDLG&/OR CONVEY OF SPEC FOR TR OFFICE TO LAB  - COLLECTION VENOUS BLOOD,VENIPUNCTURE    2. Recurrent depression (HCC)  Stable on citalopram    3. Malignant neoplasm of lower-inner quadrant of left breast in female, estrogen receptor positive ([facility] [address]  Followed by [place] [facility]. On [address] [place] [address]    4. Medicare annual wellness visit, subsequent      5. Depression, unspecified depression type    - citalopram (CELEXA) 40 mg tablet; TAKE ONE TABLET BY MOUTH ONCE DAILY  Dispense: 90 Tablet; Refill: 3    6. Psychophysiological insomnia  Cont trazodone  - traZODone (DESYREL) 50 mg tablet; TAKE ONE TABLET BY MOUTH EVERY EVENING  Dispense: 90 Tablet; Refill: 3    7.  Colon cancer screening    - COLOGUARD TEST (FECAL DNA COLORECTAL CANCER SCREENING)          Orders Placed This Encounter    CBC WITH AUTOMATED DIFF     Standing Status:   Future     Standing Expiration Date:   7/35/3346    METABOLIC PANEL, COMPREHENSIVE     Standing Status:   Future     Standing Expiration Date:   6/15/2023    COLOGUARD TEST (FECAL DNA COLORECTAL CANCER SCREENING)    citalopram (CELEXA) 40 mg tablet     Sig: TAKE ONE TABLET BY MOUTH ONCE DAILY     Dispense:  90 Tablet     Refill:  3    hydroCHLOROthiazide (HYDRODIURIL) 12.5 mg tablet     Sig: TAKE 1 TABLET DAILY     Dispense:  90 Tablet     Refill:  3    traZODone (DESYREL) 50 mg tablet     Sig: TAKE ONE TABLET BY MOUTH EVERY EVENING     Dispense:  90 Tablet     Refill:  3     Patient wants 90 day supply please. Thanks!        RTC 1 year    Radha Alvarez MD

## 2022-06-16 LAB
ALBUMIN SERPL-MCNC: 3.9 G/DL (ref 3.5–5)
ALBUMIN/GLOB SERPL: 1.3 {RATIO} (ref 1.1–2.2)
ALP SERPL-CCNC: 67 U/L (ref 45–117)
ALT SERPL-CCNC: 26 U/L (ref 12–78)
ANION GAP SERPL CALC-SCNC: 7 MMOL/L (ref 5–15)
AST SERPL-CCNC: 19 U/L (ref 15–37)
BASOPHILS # BLD: 0 K/UL (ref 0–0.1)
BASOPHILS NFR BLD: 0 % (ref 0–1)
BILIRUB SERPL-MCNC: 0.3 MG/DL (ref 0.2–1)
BUN SERPL-MCNC: 14 MG/DL (ref 6–20)
BUN/CREAT SERPL: 19 (ref 12–20)
CALCIUM SERPL-MCNC: 9.1 MG/DL (ref 8.5–10.1)
CHLORIDE SERPL-SCNC: 101 MMOL/L (ref 97–108)
CO2 SERPL-SCNC: 31 MMOL/L (ref 21–32)
CREAT SERPL-MCNC: 0.74 MG/DL (ref 0.55–1.02)
DIFFERENTIAL METHOD BLD: ABNORMAL
EOSINOPHIL # BLD: 0.1 K/UL (ref 0–0.4)
EOSINOPHIL NFR BLD: 2 % (ref 0–7)
ERYTHROCYTE [DISTWIDTH] IN BLOOD BY AUTOMATED COUNT: 13 % (ref 11.5–14.5)
GLOBULIN SER CALC-MCNC: 3 G/DL (ref 2–4)
GLUCOSE SERPL-MCNC: 98 MG/DL (ref 65–100)
HCT VFR BLD AUTO: 41.3 % (ref 35–47)
HGB BLD-MCNC: 13.4 G/DL (ref 11.5–16)
IMM GRANULOCYTES # BLD AUTO: 0 K/UL (ref 0–0.04)
IMM GRANULOCYTES NFR BLD AUTO: 1 % (ref 0–0.5)
LYMPHOCYTES # BLD: 0.9 K/UL (ref 0.8–3.5)
LYMPHOCYTES NFR BLD: 20 % (ref 12–49)
MCH RBC QN AUTO: 30.3 PG (ref 26–34)
MCHC RBC AUTO-ENTMCNC: 32.4 G/DL (ref 30–36.5)
MCV RBC AUTO: 93.4 FL (ref 80–99)
MONOCYTES # BLD: 0.4 K/UL (ref 0–1)
MONOCYTES NFR BLD: 9 % (ref 5–13)
NEUTS SEG # BLD: 3 K/UL (ref 1.8–8)
NEUTS SEG NFR BLD: 68 % (ref 32–75)
NRBC # BLD: 0 K/UL (ref 0–0.01)
NRBC BLD-RTO: 0 PER 100 WBC
PLATELET # BLD AUTO: 195 K/UL (ref 150–400)
PMV BLD AUTO: 9.4 FL (ref 8.9–12.9)
POTASSIUM SERPL-SCNC: 3.8 MMOL/L (ref 3.5–5.1)
PROT SERPL-MCNC: 6.9 G/DL (ref 6.4–8.2)
RBC # BLD AUTO: 4.42 M/UL (ref 3.8–5.2)
SODIUM SERPL-SCNC: 139 MMOL/L (ref 136–145)
WBC # BLD AUTO: 4.4 K/UL (ref 3.6–11)

## 2022-10-14 ENCOUNTER — HOSPITAL ENCOUNTER (OUTPATIENT)
Dept: MAMMOGRAPHY | Age: 67
Discharge: HOME | End: 2022-10-14
Attending: SURGERY
Payer: MEDICARE

## 2022-10-14 DIAGNOSIS — Z12.31 SCREENING MAMMOGRAM FOR HIGH-RISK PATIENT: ICD-10-CM

## 2022-10-14 PROCEDURE — 77063 BREAST TOMOSYNTHESIS BI: CPT

## 2022-10-17 ENCOUNTER — TRANSCRIBE ORDER (OUTPATIENT)
Dept: SCHEDULING | Age: 67
End: 2022-10-17

## 2022-10-17 DIAGNOSIS — C50.212 MALIGNANT NEOPLASM OF UPPER-INNER QUADRANT OF LEFT FEMALE BREAST (HCC): Primary | ICD-10-CM

## 2022-10-17 DIAGNOSIS — L03.90 CELLULITIS, UNSPECIFIED: ICD-10-CM

## 2022-10-17 DIAGNOSIS — L27.0 GENERALIZED SKIN ERUPTION DUE TO DRUGS AND MEDICAMENTS TAKEN INTERNALLY: ICD-10-CM

## 2022-10-17 DIAGNOSIS — Z79.811 LONG TERM CURRENT USE OF AROMATASE INHIBITOR: ICD-10-CM

## 2022-10-17 DIAGNOSIS — Z78.0 ASYMPTOMATIC MENOPAUSAL STATE: ICD-10-CM

## 2023-01-26 ENCOUNTER — HOSPITAL ENCOUNTER (OUTPATIENT)
Dept: MAMMOGRAPHY | Age: 68
Discharge: HOME | End: 2023-01-26
Attending: INTERNAL MEDICINE
Payer: MEDICARE

## 2023-01-26 DIAGNOSIS — L03.90 CELLULITIS, UNSPECIFIED: ICD-10-CM

## 2023-01-26 DIAGNOSIS — L27.0 GENERALIZED SKIN ERUPTION DUE TO DRUGS AND MEDICAMENTS TAKEN INTERNALLY: ICD-10-CM

## 2023-01-26 DIAGNOSIS — Z79.811 LONG TERM CURRENT USE OF AROMATASE INHIBITOR: ICD-10-CM

## 2023-01-26 DIAGNOSIS — Z78.0 ASYMPTOMATIC MENOPAUSAL STATE: ICD-10-CM

## 2023-01-26 DIAGNOSIS — C50.212 MALIGNANT NEOPLASM OF UPPER-INNER QUADRANT OF LEFT FEMALE BREAST (HCC): ICD-10-CM

## 2023-01-26 PROCEDURE — 77080 DXA BONE DENSITY AXIAL: CPT

## 2023-04-26 DIAGNOSIS — F51.04 PSYCHOPHYSIOLOGICAL INSOMNIA: ICD-10-CM

## 2023-04-26 DIAGNOSIS — I10 ESSENTIAL HYPERTENSION: ICD-10-CM

## 2023-04-26 DIAGNOSIS — F32.A DEPRESSION, UNSPECIFIED DEPRESSION TYPE: ICD-10-CM

## 2023-04-26 RX ORDER — CITALOPRAM 40 MG/1
TABLET, FILM COATED ORAL
Qty: 90 TABLET | Refills: 3 | Status: SHIPPED | OUTPATIENT
Start: 2023-04-26

## 2023-04-26 RX ORDER — TRAZODONE HYDROCHLORIDE 50 MG/1
TABLET ORAL
Qty: 90 TABLET | Refills: 3 | Status: SHIPPED | OUTPATIENT
Start: 2023-04-26

## 2023-04-26 RX ORDER — HYDROCHLOROTHIAZIDE 12.5 MG/1
TABLET ORAL
Qty: 90 TABLET | Refills: 3 | Status: SHIPPED | OUTPATIENT
Start: 2023-04-26

## 2023-04-26 NOTE — TELEPHONE ENCOUNTER
Refill Citalopram 40 mg, Trazdone 50 mg, and Hydrocholorthiazide 12.5 mg to [facility]. Patient has an appointment Friday 4/28.

## 2023-04-28 ENCOUNTER — OFFICE VISIT (OUTPATIENT)
Dept: FAMILY MEDICINE CLINIC | Age: 68
End: 2023-04-28
Payer: MEDICARE

## 2023-04-28 VITALS
OXYGEN SATURATION: 95 % | HEIGHT: 64 IN | BODY MASS INDEX: 25.1 KG/M2 | DIASTOLIC BLOOD PRESSURE: 88 MMHG | TEMPERATURE: 96.9 F | RESPIRATION RATE: 16 BRPM | SYSTOLIC BLOOD PRESSURE: 126 MMHG | WEIGHT: 147 LBS | HEART RATE: 79 BPM

## 2023-04-28 DIAGNOSIS — F33.9 RECURRENT DEPRESSION (HCC): ICD-10-CM

## 2023-04-28 DIAGNOSIS — F32.A DEPRESSION, UNSPECIFIED DEPRESSION TYPE: ICD-10-CM

## 2023-04-28 DIAGNOSIS — E78.5 HYPERLIPIDEMIA, UNSPECIFIED HYPERLIPIDEMIA TYPE: ICD-10-CM

## 2023-04-28 DIAGNOSIS — I10 ESSENTIAL HYPERTENSION: ICD-10-CM

## 2023-04-28 DIAGNOSIS — H35.30 MACULAR DEGENERATION OF LEFT EYE, UNSPECIFIED TYPE: Primary | ICD-10-CM

## 2023-04-28 PROCEDURE — G8427 DOCREV CUR MEDS BY ELIG CLIN: HCPCS | Performed by: FAMILY MEDICINE

## 2023-04-28 PROCEDURE — G8536 NO DOC ELDER MAL SCRN: HCPCS | Performed by: FAMILY MEDICINE

## 2023-04-28 PROCEDURE — 3079F DIAST BP 80-89 MM HG: CPT | Performed by: FAMILY MEDICINE

## 2023-04-28 PROCEDURE — G9899 SCRN MAM PERF RSLTS DOC: HCPCS | Performed by: FAMILY MEDICINE

## 2023-04-28 PROCEDURE — 3017F COLORECTAL CA SCREEN DOC REV: CPT | Performed by: FAMILY MEDICINE

## 2023-04-28 PROCEDURE — 1123F ACP DISCUSS/DSCN MKR DOCD: CPT | Performed by: FAMILY MEDICINE

## 2023-04-28 PROCEDURE — 99213 OFFICE O/P EST LOW 20 MIN: CPT | Performed by: FAMILY MEDICINE

## 2023-04-28 PROCEDURE — 3074F SYST BP LT 130 MM HG: CPT | Performed by: FAMILY MEDICINE

## 2023-04-28 PROCEDURE — G8399 PT W/DXA RESULTS DOCUMENT: HCPCS | Performed by: FAMILY MEDICINE

## 2023-04-28 PROCEDURE — G9717 DOC PT DX DEP/BP F/U NT REQ: HCPCS | Performed by: FAMILY MEDICINE

## 2023-04-28 PROCEDURE — 1101F PT FALLS ASSESS-DOCD LE1/YR: CPT | Performed by: FAMILY MEDICINE

## 2023-04-28 PROCEDURE — 1090F PRES/ABSN URINE INCON ASSESS: CPT | Performed by: FAMILY MEDICINE

## 2023-04-28 PROCEDURE — G8417 CALC BMI ABV UP PARAM F/U: HCPCS | Performed by: FAMILY MEDICINE

## 2023-04-28 NOTE — PROGRESS NOTES
YES Answers must have Comments  1. \"Have you been to the ER, urgent care clinic since your last visit? Hospitalized since your last visit? \"    [] YES   [x] NO       2. Have you seen or consulted any other health care providers outside of [address] [place] [address] since your last visit?     [] YES   [x] NO       3. For patients aged 39-70: Have you had a colorectal cancer screening such as a colonoscopy/FIT/Cologuard? Nurse/CMA to request records if not in chart   [x] YES   [] NO   [] NA, based on age    If the patient is female:      4. For female patients aged 41-77: Erik Schmidt you had a mammogram in the last two years?  Nurse/CMA to request records if not in chart   [x] YES   [] NO   [] NA, based on age    11. For female patients aged 21-65: Erik Schmidt you had a pap smear?   Nurse/CMA to request records if not in chart   [] YES   [] NO  [x] NA, based on age    4/28/2023      Chief Complaint   Patient presents with    Hypertension    Cholesterol Problem    Depression         History of Present Illness:        Justin Lynne is a 76 y.o. female feeling a little overwhelmed with new diagnosis of macular degeneration, not seeing well out of [facility], seeing retina specialist in [place] soon. Feels more anxious and overwhelmed. No Known Allergies    Current Outpatient Medications   Medication Sig    citalopram (CELEXA) 40 mg tablet TAKE ONE TABLET BY MOUTH ONCE DAILY    traZODone (DESYREL) 50 mg tablet TAKE ONE TABLET BY MOUTH EVERY EVENING    hydroCHLOROthiazide (HYDRODIURIL) 12.5 mg tablet TAKE 1 TABLET DAILY    albuterol (PROVENTIL HFA, VENTOLIN HFA, PROAIR HFA) 90 mcg/actuation inhaler Take 2 Puffs by inhalation every four (4) hours as needed for Wheezing. ZINC PO Take  by mouth. CALCIUM PO Take  by mouth. ibandronate (BONIVA) 150 mg tablet     red yeast rice extract 600 mg cap Take 1 Capsule by mouth now.     letrozole (FEMARA) 2.5 mg tablet     metroNIDAZOLE (METROGEL) 0.75 % topical gel Apply  to affected area two (2) times a day. multivitamin (ONE A DAY) tablet Take 1 Tablet by mouth daily. No current facility-administered medications for this visit. Physical Examination:    Visit Vitals  /88 (BP 1 Location: Right arm, BP Patient Position: Sitting, BP Cuff Size: Adult)   Pulse 79   Temp 96.9 °F (36.1 °C) (Oral)   Resp 16   Ht 5' 4\" (1.626 m)   Wt 147 lb (66.7 kg)   SpO2 95%   BMI 25.23 kg/m²      General:  Alert, cooperative, no distress. HEENT:  Normocephalic, without obvious abnormality, atraumatic. Conjunctivae/corneas clear. Pupils equal, round, reactive to light. Extraocular movements intact. TMs and external canals normal bilaterally. Nasal mucosa and oropharynx clear. Lungs: Clear to auscultation bilaterally. Chest wall:  No tenderness or deformity. Heart:  Regular rate and rhythm, S1, S2 normal, no murmur, click, rub, or gallop. Abdomen:   Soft, non-tender. Bowel sounds normal. No masses. No organomegaly. Extremities: Extremities normal, atraumatic, no cyanosis or edema. Pulses: 2+ and symmetric all extremities. Skin: Skin color, texture, turgor normal. No rashes or lesions. Lymph nodes: Cervical, supraclavicular, and axillary nodes normal.   Neurologic: CNII-XII intact. Normal strength, sensation, and reflexes throughout. ASSESSMENT AND PLAN    1. Macular degeneration of left eye, unspecified type      2. Recurrent depression ([address]  Declined to prescribe prn benzo. Will consider other interventions if worsening    3. Depression, unspecified depression type      4. Essential hypertension  Good control    5. Hyperlipidemia, unspecified hyperlipidemia type  Due for recheck. Ten year risk just below 10% two years ago. - LIPID PANEL; Future  - METABOLIC PANEL, COMPREHENSIVE;  Future              Orders Placed This Encounter    LIPID PANEL     Standing Status:   Future     Standing Expiration Date:   1/86/4248    METABOLIC PANEL, COMPREHENSIVE     Standing Status: Future     Standing Expiration Date:   4/28/2024       RTc 6 months    Vasyl Ma MD

## 2023-05-20 RX ORDER — ALBUTEROL SULFATE 90 UG/1
2 AEROSOL, METERED RESPIRATORY (INHALATION) EVERY 4 HOURS PRN
COMMUNITY
Start: 2021-04-26

## 2023-05-20 RX ORDER — CITALOPRAM 40 MG/1
1 TABLET ORAL DAILY
COMMUNITY
Start: 2023-04-26

## 2023-05-20 RX ORDER — METRONIDAZOLE 7.5 MG/G
GEL TOPICAL 2 TIMES DAILY
COMMUNITY
Start: 2016-03-23

## 2023-05-20 RX ORDER — IBANDRONATE SODIUM 150 MG/1
TABLET, FILM COATED ORAL
COMMUNITY
Start: 2018-06-22

## 2023-05-20 RX ORDER — LETROZOLE 2.5 MG/1
TABLET, FILM COATED ORAL
COMMUNITY
Start: 2017-03-20

## 2023-05-20 RX ORDER — CRANBERRY FRUIT EXTRACT 200 MG
600 CAPSULE ORAL
COMMUNITY

## 2023-05-20 RX ORDER — TRAZODONE HYDROCHLORIDE 50 MG/1
1 TABLET ORAL NIGHTLY
COMMUNITY
Start: 2023-04-26

## 2023-05-20 RX ORDER — HYDROCHLOROTHIAZIDE 12.5 MG/1
1 TABLET ORAL DAILY
COMMUNITY
Start: 2023-04-26

## 2023-09-29 ENCOUNTER — TRANSCRIBE ORDERS (OUTPATIENT)
Facility: HOSPITAL | Age: 68
End: 2023-09-29

## 2023-09-29 DIAGNOSIS — Z12.31 SCREENING MAMMOGRAM FOR HIGH-RISK PATIENT: Primary | ICD-10-CM

## 2023-10-19 ENCOUNTER — HOSPITAL ENCOUNTER (OUTPATIENT)
Facility: HOSPITAL | Age: 68
Discharge: HOME | End: 2023-10-19
Attending: SURGERY
Payer: MEDICARE

## 2023-10-19 VITALS — HEIGHT: 64 IN | WEIGHT: 147 LBS | BODY MASS INDEX: 25.1 KG/M2

## 2023-10-19 DIAGNOSIS — Z12.31 SCREENING MAMMOGRAM FOR HIGH-RISK PATIENT: ICD-10-CM

## 2023-10-19 PROCEDURE — 77063 BREAST TOMOSYNTHESIS BI: CPT

## 2024-02-26 RX ORDER — TRAZODONE HYDROCHLORIDE 50 MG/1
50 TABLET ORAL EVERY EVENING
Qty: 90 TABLET | Refills: 3 | Status: SHIPPED | OUTPATIENT
Start: 2024-02-26

## 2024-03-04 RX ORDER — HYDROCHLOROTHIAZIDE 12.5 MG/1
12.5 TABLET ORAL DAILY
Qty: 45 TABLET | Refills: 0 | Status: SHIPPED | OUTPATIENT
Start: 2024-03-04

## 2024-03-04 RX ORDER — CITALOPRAM 40 MG/1
40 TABLET ORAL DAILY
Qty: 45 TABLET | Refills: 0 | Status: SHIPPED | OUTPATIENT
Start: 2024-03-04

## 2024-03-04 NOTE — TELEPHONE ENCOUNTER
Requested Prescriptions     Pending Prescriptions Disp Refills    citalopram (CELEXA) 40 MG tablet [Pharmacy Med Name: CITALOPRAM TAB 40MG] 90 tablet 3     Sig: TAKE 1 TABLET ONCE DAILY    hydroCHLOROthiazide 12.5 MG tablet [Pharmacy Med Name: HYDROCHLOROT TAB 12.5MG] 90 tablet 3     Sig: TAKE 1 TABLET DAILY     LOV 4/28/23  Last labs 6/15/22  Last refill Celexa 4/26/23   Last refill Hydrochlorothiazide 4/26/23

## 2024-04-12 NOTE — TELEPHONE ENCOUNTER
Requested Prescriptions     Pending Prescriptions Disp Refills    hydroCHLOROthiazide 12.5 MG tablet [Pharmacy Med Name: HYDROCHLOROT TAB 12.5MG] 45 tablet 0     Sig: TAKE 1 TABLET DAILY    citalopram (CELEXA) 40 MG tablet [Pharmacy Med Name: CITALOPRAM TAB 40MG] 45 tablet 0     Sig: TAKE 1 TABLET ONCE DAILY     Last seen:  4/28/23

## 2024-04-13 RX ORDER — CITALOPRAM 40 MG/1
40 TABLET ORAL DAILY
Qty: 20 TABLET | Refills: 0 | Status: SHIPPED | OUTPATIENT
Start: 2024-04-13

## 2024-04-13 RX ORDER — HYDROCHLOROTHIAZIDE 12.5 MG/1
12.5 TABLET ORAL DAILY
Qty: 20 TABLET | Refills: 0 | Status: SHIPPED | OUTPATIENT
Start: 2024-04-13

## 2024-04-30 RX ORDER — HYDROCHLOROTHIAZIDE 12.5 MG/1
12.5 TABLET ORAL DAILY
Qty: 15 TABLET | Refills: 0 | Status: SHIPPED | OUTPATIENT
Start: 2024-04-30

## 2024-05-02 NOTE — TELEPHONE ENCOUNTER
Requested Prescriptions     Pending Prescriptions Disp Refills    citalopram (CELEXA) 40 MG tablet [Pharmacy Med Name: CITALOPRAM TAB 40MG] 20 tablet 0     Sig: TAKE 1 TABLET ONCE DAILY     Last seen:  4/28/23

## 2024-05-03 RX ORDER — CITALOPRAM 40 MG/1
40 TABLET ORAL DAILY
Qty: 10 TABLET | Refills: 0 | Status: SHIPPED | OUTPATIENT
Start: 2024-05-03

## 2024-06-28 SDOH — ECONOMIC STABILITY: INCOME INSECURITY: HOW HARD IS IT FOR YOU TO PAY FOR THE VERY BASICS LIKE FOOD, HOUSING, MEDICAL CARE, AND HEATING?: NOT HARD AT ALL

## 2024-06-28 SDOH — ECONOMIC STABILITY: HOUSING INSECURITY
IN THE LAST 12 MONTHS, WAS THERE A TIME WHEN YOU DID NOT HAVE A STEADY PLACE TO SLEEP OR SLEPT IN A SHELTER (INCLUDING NOW)?: NO

## 2024-06-28 SDOH — ECONOMIC STABILITY: FOOD INSECURITY: WITHIN THE PAST 12 MONTHS, THE FOOD YOU BOUGHT JUST DIDN'T LAST AND YOU DIDN'T HAVE MONEY TO GET MORE.: NEVER TRUE

## 2024-06-28 SDOH — ECONOMIC STABILITY: TRANSPORTATION INSECURITY
IN THE PAST 12 MONTHS, HAS LACK OF TRANSPORTATION KEPT YOU FROM MEETINGS, WORK, OR FROM GETTING THINGS NEEDED FOR DAILY LIVING?: NO

## 2024-06-28 SDOH — ECONOMIC STABILITY: FOOD INSECURITY: WITHIN THE PAST 12 MONTHS, YOU WORRIED THAT YOUR FOOD WOULD RUN OUT BEFORE YOU GOT MONEY TO BUY MORE.: NEVER TRUE

## 2024-07-01 ENCOUNTER — OFFICE VISIT (OUTPATIENT)
Dept: FAMILY MEDICINE CLINIC | Age: 69
End: 2024-07-01
Payer: MEDICARE

## 2024-07-01 VITALS
WEIGHT: 131.8 LBS | BODY MASS INDEX: 22.5 KG/M2 | DIASTOLIC BLOOD PRESSURE: 76 MMHG | TEMPERATURE: 97.5 F | HEIGHT: 64 IN | SYSTOLIC BLOOD PRESSURE: 108 MMHG | OXYGEN SATURATION: 96 % | HEART RATE: 94 BPM | RESPIRATION RATE: 16 BRPM

## 2024-07-01 DIAGNOSIS — E78.2 HYPERLIPEMIA, MIXED: ICD-10-CM

## 2024-07-01 DIAGNOSIS — E78.2 MIXED HYPERLIPIDEMIA: ICD-10-CM

## 2024-07-01 DIAGNOSIS — F33.41 RECURRENT MAJOR DEPRESSIVE DISORDER, IN PARTIAL REMISSION (HCC): ICD-10-CM

## 2024-07-01 DIAGNOSIS — I10 ESSENTIAL (PRIMARY) HYPERTENSION: Primary | ICD-10-CM

## 2024-07-01 DIAGNOSIS — Z00.00 MEDICARE ANNUAL WELLNESS VISIT, SUBSEQUENT: ICD-10-CM

## 2024-07-01 PROBLEM — C50.912 MALIGNANT NEOPLASM OF LEFT BREAST IN FEMALE, ESTROGEN RECEPTOR POSITIVE (HCC): Status: RESOLVED | Noted: 2017-12-07 | Resolved: 2024-07-01

## 2024-07-01 PROBLEM — Z17.0 MALIGNANT NEOPLASM OF LEFT BREAST IN FEMALE, ESTROGEN RECEPTOR POSITIVE (HCC): Status: RESOLVED | Noted: 2017-12-07 | Resolved: 2024-07-01

## 2024-07-01 PROCEDURE — 36415 COLL VENOUS BLD VENIPUNCTURE: CPT | Performed by: FAMILY MEDICINE

## 2024-07-01 PROCEDURE — 1123F ACP DISCUSS/DSCN MKR DOCD: CPT | Performed by: FAMILY MEDICINE

## 2024-07-01 PROCEDURE — 3074F SYST BP LT 130 MM HG: CPT | Performed by: FAMILY MEDICINE

## 2024-07-01 PROCEDURE — G0439 PPPS, SUBSEQ VISIT: HCPCS | Performed by: FAMILY MEDICINE

## 2024-07-01 PROCEDURE — 99214 OFFICE O/P EST MOD 30 MIN: CPT | Performed by: FAMILY MEDICINE

## 2024-07-01 PROCEDURE — 3078F DIAST BP <80 MM HG: CPT | Performed by: FAMILY MEDICINE

## 2024-07-01 RX ORDER — CITALOPRAM 40 MG/1
40 TABLET ORAL DAILY
Qty: 90 TABLET | Refills: 0 | Status: SHIPPED | OUTPATIENT
Start: 2024-07-01

## 2024-07-01 RX ORDER — TRAZODONE HYDROCHLORIDE 50 MG/1
50 TABLET ORAL EVERY EVENING
Qty: 90 TABLET | Refills: 3 | Status: SHIPPED | OUTPATIENT
Start: 2024-07-01

## 2024-07-01 RX ORDER — HYDROCHLOROTHIAZIDE 12.5 MG/1
12.5 TABLET ORAL DAILY
Qty: 90 TABLET | Refills: 3 | Status: SHIPPED | OUTPATIENT
Start: 2024-07-01

## 2024-07-01 SDOH — ECONOMIC STABILITY: INCOME INSECURITY: HOW HARD IS IT FOR YOU TO PAY FOR THE VERY BASICS LIKE FOOD, HOUSING, MEDICAL CARE, AND HEATING?: NOT VERY HARD

## 2024-07-01 SDOH — ECONOMIC STABILITY: FOOD INSECURITY: WITHIN THE PAST 12 MONTHS, THE FOOD YOU BOUGHT JUST DIDN'T LAST AND YOU DIDN'T HAVE MONEY TO GET MORE.: NEVER TRUE

## 2024-07-01 SDOH — ECONOMIC STABILITY: FOOD INSECURITY: WITHIN THE PAST 12 MONTHS, YOU WORRIED THAT YOUR FOOD WOULD RUN OUT BEFORE YOU GOT MONEY TO BUY MORE.: NEVER TRUE

## 2024-07-01 ASSESSMENT — PATIENT HEALTH QUESTIONNAIRE - PHQ9
SUM OF ALL RESPONSES TO PHQ QUESTIONS 1-9: 1
2. FEELING DOWN, DEPRESSED OR HOPELESS: SEVERAL DAYS
SUM OF ALL RESPONSES TO PHQ9 QUESTIONS 1 & 2: 1
SUM OF ALL RESPONSES TO PHQ QUESTIONS 1-9: 1
1. LITTLE INTEREST OR PLEASURE IN DOING THINGS: NOT AT ALL
10. IF YOU CHECKED OFF ANY PROBLEMS, HOW DIFFICULT HAVE THESE PROBLEMS MADE IT FOR YOU TO DO YOUR WORK, TAKE CARE OF THINGS AT HOME, OR GET ALONG WITH OTHER PEOPLE: NOT DIFFICULT AT ALL
3. TROUBLE FALLING OR STAYING ASLEEP: NOT AT ALL
4. FEELING TIRED OR HAVING LITTLE ENERGY: NOT AT ALL
SUM OF ALL RESPONSES TO PHQ QUESTIONS 1-9: 1
7. TROUBLE CONCENTRATING ON THINGS, SUCH AS READING THE NEWSPAPER OR WATCHING TELEVISION: NOT AT ALL
SUM OF ALL RESPONSES TO PHQ QUESTIONS 1-9: 1
5. POOR APPETITE OR OVEREATING: NOT AT ALL
6. FEELING BAD ABOUT YOURSELF - OR THAT YOU ARE A FAILURE OR HAVE LET YOURSELF OR YOUR FAMILY DOWN: NOT AT ALL
8. MOVING OR SPEAKING SO SLOWLY THAT OTHER PEOPLE COULD HAVE NOTICED. OR THE OPPOSITE, BEING SO FIGETY OR RESTLESS THAT YOU HAVE BEEN MOVING AROUND A LOT MORE THAN USUAL: NOT AT ALL
9. THOUGHTS THAT YOU WOULD BE BETTER OFF DEAD, OR OF HURTING YOURSELF: NOT AT ALL

## 2024-07-01 ASSESSMENT — LIFESTYLE VARIABLES
HOW MANY STANDARD DRINKS CONTAINING ALCOHOL DO YOU HAVE ON A TYPICAL DAY: 1 OR 2
HOW OFTEN DO YOU HAVE A DRINK CONTAINING ALCOHOL: 2-4 TIMES A MONTH

## 2024-07-01 NOTE — PROGRESS NOTES
7/1/2024      Chief Complaint   Patient presents with    Medication Refill     And follow up    Medicare AWV     [id number]         History of Present Illness:         is a 69 y.o. female to follow up HTN and mood. Mood has been a challenge with her 's health issues over the last year.       No Known Allergies    Current Outpatient Medications   Medication Sig Dispense Refill    citalopram (CELEXA) 40 MG tablet Take 1 tablet by mouth daily 90 tablet 0    hydroCHLOROthiazide 12.5 MG tablet Take 1 tablet by mouth daily 90 tablet 3    traZODone (DESYREL) 50 MG tablet Take 1 tablet by mouth every evening 90 tablet 3    CALCIUM PO Take by mouth      ZINC PO Take by mouth      ibandronate (BONIVA) 150 MG tablet ceived the following from [facility]: Outside name: ibandronate (BONIVA) 150 mg tablet      letrozole (FEMARA) 2.5 MG tablet ceived the following from [facility]: Outside name: letrozole (FEMARA) 2.5 mg tablet      metroNIDAZOLE (METROGEL) 0.75 % gel Apply topically 2 times daily      Red Yeast Rice Extract 600 MG CAPS Take 600 mg by mouth      albuterol sulfate HFA (PROVENTIL;VENTOLIN;PROAIR) 108 (90 Base) MCG/ACT inhaler Inhale 2 puffs into the lungs every 4 hours as needed (Patient not taking: Reported on 7/1/2024)       No current facility-administered medications for this visit.             Physical Examination:    /76 (Site: Right Upper Arm, Position: Sitting, Cuff Size: Medium Adult)   Pulse 94   Temp 97.5 °F (36.4 °C) (Temporal)   Resp 16   Ht 1.626 m (5' 4\")   Wt 59.8 kg (131 lb 12.8 oz)   SpO2 96%   BMI 22.62 kg/m²    General:  Alert, cooperative, no distress.   HEENT:  Normocephalic, without obvious abnormality, atraumatic.Conjunctivae/corneas clear. Pupils equal, round, reactive to light. Extraocular movements intact.TMs and external canals normal bilaterally. Nasal mucosa and oropharynx clear.   Lungs: Clear to auscultation bilaterally.

## 2024-07-01 NOTE — PROGRESS NOTES
\"Have you been to the ER, urgent care clinic since your last visit?  Hospitalized since your last visit?\"    NO    “Have you seen or consulted any other health care providers outside of [facility] since your last visit?”    NO        Successful venipuncture in patient's Right AC X 1 sticks.  The patient tolerated this procedure w/o c/o pain or discomfort.    Click Here for Release of Records Request

## 2024-07-01 NOTE — PROGRESS NOTES
Medicare Annual Wellness Visit    Keaton Lebron is here for Medication Refill (And follow up) and Medicare AWV ([id number])    Assessment & Plan   Essential (primary) hypertension  Mixed hyperlipidemia  Recurrent major depressive disorder, in partial remission (HCC)  Medicare annual wellness visit, subsequent    Recommendations for Preventive Services Due: see orders and patient instructions/AVS.  Recommended screening schedule for the next 5-10 years is provided to the patient in written form: see Patient Instructions/AVS.     No follow-ups on file.     Subjective       Patient's complete Health Risk Assessment and screening values have been reviewed and are found in Flowsheets. The following problems were reviewed today and where indicated follow up appointments were made and/or referrals ordered.    No Positive Risk Factors identified today.                                  Objective   There were no vitals filed for this visit.   There is no height or weight on file to calculate BMI.               No Known Allergies  Prior to Visit Medications    Medication Sig Taking? Authorizing Provider   citalopram (CELEXA) 40 MG tablet TAKE 1 TABLET ONCE DAILY Yes Yovani Mabry MD   hydroCHLOROthiazide 12.5 MG tablet TAKE 1 TABLET DAILY Yes Yovani Mabry MD   traZODone (DESYREL) 50 MG tablet TAKE 1 TABLET EVERY EVENING Yes Yovani Mabry MD   CALCIUM PO Take by mouth Yes Automatic Reconciliation, Ar   ZINC PO Take by mouth Yes Automatic Reconciliation, Ar   ibandronate (BONIVA) 150 MG tablet ceived the following from [facility]: Outside name: ibandronate (BONIVA) 150 mg tablet Yes Automatic Reconciliation, Ar   letrozole (FEMARA) 2.5 MG tablet ceived the following from [facility]: Outside name: letrozole (FEMARA) 2.5 mg tablet Yes Automatic Reconciliation, Ar   metroNIDAZOLE (METROGEL) 0.75 % gel Apply topically 2 times daily Yes Automatic Reconciliation, Ar   Red Yeast Rice

## 2024-07-02 LAB
ALBUMIN SERPL-MCNC: 4.1 G/DL (ref 3.5–5)
ALBUMIN/GLOB SERPL: 1.3 (ref 1.1–2.2)
ALP SERPL-CCNC: 61 U/L (ref 45–117)
ALT SERPL-CCNC: 18 U/L (ref 12–78)
ANION GAP SERPL CALC-SCNC: 10 MMOL/L (ref 5–15)
AST SERPL-CCNC: 19 U/L (ref 15–37)
BASOPHILS # BLD: 0 K/UL (ref 0–0.1)
BASOPHILS NFR BLD: 0 % (ref 0–1)
BILIRUB SERPL-MCNC: 0.5 MG/DL (ref 0.2–1)
BUN SERPL-MCNC: 17 MG/DL (ref 6–20)
BUN/CREAT SERPL: 18 (ref 12–20)
CALCIUM SERPL-MCNC: 9.9 MG/DL (ref 8.5–10.1)
CHLORIDE SERPL-SCNC: 101 MMOL/L (ref 97–108)
CHOLEST SERPL-MCNC: 222 MG/DL
CO2 SERPL-SCNC: 25 MMOL/L (ref 21–32)
CREAT SERPL-MCNC: 0.94 MG/DL (ref 0.55–1.02)
DIFFERENTIAL METHOD BLD: NORMAL
EOSINOPHIL # BLD: 0.1 K/UL (ref 0–0.4)
EOSINOPHIL NFR BLD: 2 % (ref 0–7)
ERYTHROCYTE [DISTWIDTH] IN BLOOD BY AUTOMATED COUNT: 12.5 % (ref 11.5–14.5)
GLOBULIN SER CALC-MCNC: 3.2 G/DL (ref 2–4)
GLUCOSE SERPL-MCNC: 98 MG/DL (ref 65–100)
HCT VFR BLD AUTO: 42.4 % (ref 35–47)
HDLC SERPL-MCNC: 58 MG/DL
HDLC SERPL: 3.8 (ref 0–5)
HGB BLD-MCNC: 14 G/DL (ref 11.5–16)
IMM GRANULOCYTES # BLD AUTO: 0 K/UL (ref 0–0.04)
IMM GRANULOCYTES NFR BLD AUTO: 0 % (ref 0–0.5)
LDLC SERPL CALC-MCNC: 144.8 MG/DL (ref 0–100)
LYMPHOCYTES # BLD: 1.3 K/UL (ref 0.8–3.5)
LYMPHOCYTES NFR BLD: 26 % (ref 12–49)
MCH RBC QN AUTO: 30.8 PG (ref 26–34)
MCHC RBC AUTO-ENTMCNC: 33 G/DL (ref 30–36.5)
MCV RBC AUTO: 93.4 FL (ref 80–99)
MONOCYTES # BLD: 0.5 K/UL (ref 0–1)
MONOCYTES NFR BLD: 10 % (ref 5–13)
NEUTS SEG # BLD: 3.2 K/UL (ref 1.8–8)
NEUTS SEG NFR BLD: 62 % (ref 32–75)
NRBC # BLD: 0 K/UL (ref 0–0.01)
NRBC BLD-RTO: 0 PER 100 WBC
PLATELET # BLD AUTO: 207 K/UL (ref 150–400)
PMV BLD AUTO: 9.9 FL (ref 8.9–12.9)
POTASSIUM SERPL-SCNC: 3.7 MMOL/L (ref 3.5–5.1)
PROT SERPL-MCNC: 7.3 G/DL (ref 6.4–8.2)
RBC # BLD AUTO: 4.54 M/UL (ref 3.8–5.2)
SODIUM SERPL-SCNC: 136 MMOL/L (ref 136–145)
TRIGL SERPL-MCNC: 96 MG/DL
VLDLC SERPL CALC-MCNC: 19.2 MG/DL
WBC # BLD AUTO: 5.1 K/UL (ref 3.6–11)

## 2024-08-12 ENCOUNTER — TRANSCRIBE ORDERS (OUTPATIENT)
Facility: HOSPITAL | Age: 69
End: 2024-08-12

## 2024-08-12 DIAGNOSIS — Z12.31 SCREENING MAMMOGRAM FOR HIGH-RISK PATIENT: Primary | ICD-10-CM

## 2024-09-11 DIAGNOSIS — F33.41 RECURRENT MAJOR DEPRESSIVE DISORDER, IN PARTIAL REMISSION (HCC): ICD-10-CM

## 2024-09-12 RX ORDER — CITALOPRAM HYDROBROMIDE 40 MG/1
40 TABLET ORAL DAILY
Qty: 90 TABLET | Refills: 0 | Status: SHIPPED | OUTPATIENT
Start: 2024-09-12

## 2024-10-22 ENCOUNTER — HOSPITAL ENCOUNTER (OUTPATIENT)
Facility: HOSPITAL | Age: 69
Discharge: HOME | End: 2024-10-25
Attending: SURGERY
Payer: MEDICARE

## 2024-10-22 VITALS — WEIGHT: 131 LBS | HEIGHT: 64 IN | BODY MASS INDEX: 22.36 KG/M2

## 2024-10-22 DIAGNOSIS — Z12.31 ENCOUNTER FOR SCREENING MAMMOGRAM FOR HIGH-RISK PATIENT: ICD-10-CM

## 2024-10-22 PROCEDURE — 77063 BREAST TOMOSYNTHESIS BI: CPT

## 2024-11-25 DIAGNOSIS — F33.41 RECURRENT MAJOR DEPRESSIVE DISORDER, IN PARTIAL REMISSION (HCC): ICD-10-CM

## 2024-11-26 RX ORDER — CITALOPRAM HYDROBROMIDE 40 MG/1
40 TABLET ORAL DAILY
Qty: 90 TABLET | Refills: 3 | Status: SHIPPED | OUTPATIENT
Start: 2024-11-26

## 2024-11-26 NOTE — TELEPHONE ENCOUNTER
Patient requesting refill on     Requested Prescriptions     Pending Prescriptions Disp Refills    citalopram (CELEXA) 40 MG tablet [Pharmacy Med Name: CITALOPRAM TAB 40MG] 90 tablet 0     Sig: TAKE 1 TABLET DAILY        Last OV 7/1/2024

## 2025-05-03 DIAGNOSIS — I10 ESSENTIAL (PRIMARY) HYPERTENSION: ICD-10-CM

## 2025-05-04 RX ORDER — HYDROCHLOROTHIAZIDE 12.5 MG/1
12.5 TABLET ORAL DAILY
Qty: 90 TABLET | Refills: 0 | Status: SHIPPED | OUTPATIENT
Start: 2025-05-04

## 2025-06-30 SDOH — ECONOMIC STABILITY: FOOD INSECURITY: WITHIN THE PAST 12 MONTHS, THE FOOD YOU BOUGHT JUST DIDN'T LAST AND YOU DIDN'T HAVE MONEY TO GET MORE.: NEVER TRUE

## 2025-06-30 SDOH — ECONOMIC STABILITY: FOOD INSECURITY: WITHIN THE PAST 12 MONTHS, YOU WORRIED THAT YOUR FOOD WOULD RUN OUT BEFORE YOU GOT MONEY TO BUY MORE.: NEVER TRUE

## 2025-06-30 SDOH — ECONOMIC STABILITY: INCOME INSECURITY: IN THE LAST 12 MONTHS, WAS THERE A TIME WHEN YOU WERE NOT ABLE TO PAY THE MORTGAGE OR RENT ON TIME?: NO

## 2025-06-30 SDOH — ECONOMIC STABILITY: TRANSPORTATION INSECURITY
IN THE PAST 12 MONTHS, HAS THE LACK OF TRANSPORTATION KEPT YOU FROM MEDICAL APPOINTMENTS OR FROM GETTING MEDICATIONS?: NO

## 2025-07-02 ENCOUNTER — OFFICE VISIT (OUTPATIENT)
Dept: FAMILY MEDICINE CLINIC | Age: 70
End: 2025-07-02
Payer: MEDICARE

## 2025-07-02 VITALS
OXYGEN SATURATION: 93 % | RESPIRATION RATE: 16 BRPM | DIASTOLIC BLOOD PRESSURE: 83 MMHG | BODY MASS INDEX: 22.2 KG/M2 | HEART RATE: 88 BPM | HEIGHT: 64 IN | WEIGHT: 130 LBS | TEMPERATURE: 97 F | SYSTOLIC BLOOD PRESSURE: 127 MMHG

## 2025-07-02 DIAGNOSIS — Z12.11 SCREEN FOR COLON CANCER: ICD-10-CM

## 2025-07-02 DIAGNOSIS — Z00.00 MEDICARE ANNUAL WELLNESS VISIT, SUBSEQUENT: Primary | ICD-10-CM

## 2025-07-02 DIAGNOSIS — E78.2 MIXED HYPERLIPIDEMIA: ICD-10-CM

## 2025-07-02 DIAGNOSIS — F33.41 RECURRENT MAJOR DEPRESSIVE DISORDER, IN PARTIAL REMISSION: ICD-10-CM

## 2025-07-02 DIAGNOSIS — I10 ESSENTIAL (PRIMARY) HYPERTENSION: ICD-10-CM

## 2025-07-02 PROCEDURE — G0439 PPPS, SUBSEQ VISIT: HCPCS | Performed by: FAMILY MEDICINE

## 2025-07-02 PROCEDURE — 1123F ACP DISCUSS/DSCN MKR DOCD: CPT | Performed by: FAMILY MEDICINE

## 2025-07-02 PROCEDURE — 1159F MED LIST DOCD IN RCRD: CPT | Performed by: FAMILY MEDICINE

## 2025-07-02 PROCEDURE — 36415 COLL VENOUS BLD VENIPUNCTURE: CPT | Performed by: FAMILY MEDICINE

## 2025-07-02 PROCEDURE — 3074F SYST BP LT 130 MM HG: CPT | Performed by: FAMILY MEDICINE

## 2025-07-02 PROCEDURE — 1126F AMNT PAIN NOTED NONE PRSNT: CPT | Performed by: FAMILY MEDICINE

## 2025-07-02 PROCEDURE — 3079F DIAST BP 80-89 MM HG: CPT | Performed by: FAMILY MEDICINE

## 2025-07-02 PROCEDURE — 99214 OFFICE O/P EST MOD 30 MIN: CPT | Performed by: FAMILY MEDICINE

## 2025-07-02 RX ORDER — HYDROCHLOROTHIAZIDE 12.5 MG/1
12.5 TABLET ORAL DAILY
Qty: 90 TABLET | Refills: 1 | Status: SHIPPED | OUTPATIENT
Start: 2025-07-02

## 2025-07-02 RX ORDER — TRAZODONE HYDROCHLORIDE 50 MG/1
50 TABLET ORAL EVERY EVENING
Qty: 90 TABLET | Refills: 3 | Status: SHIPPED | OUTPATIENT
Start: 2025-07-02

## 2025-07-02 RX ORDER — CITALOPRAM HYDROBROMIDE 40 MG/1
40 TABLET ORAL DAILY
Qty: 90 TABLET | Refills: 3 | Status: SHIPPED | OUTPATIENT
Start: 2025-07-02

## 2025-07-02 ASSESSMENT — PATIENT HEALTH QUESTIONNAIRE - PHQ9
SUM OF ALL RESPONSES TO PHQ QUESTIONS 1-9: 0
5. POOR APPETITE OR OVEREATING: NOT AT ALL
9. THOUGHTS THAT YOU WOULD BE BETTER OFF DEAD, OR OF HURTING YOURSELF: NOT AT ALL
1. LITTLE INTEREST OR PLEASURE IN DOING THINGS: NOT AT ALL
3. TROUBLE FALLING OR STAYING ASLEEP: NOT AT ALL
6. FEELING BAD ABOUT YOURSELF - OR THAT YOU ARE A FAILURE OR HAVE LET YOURSELF OR YOUR FAMILY DOWN: NOT AT ALL
4. FEELING TIRED OR HAVING LITTLE ENERGY: NOT AT ALL
7. TROUBLE CONCENTRATING ON THINGS, SUCH AS READING THE NEWSPAPER OR WATCHING TELEVISION: NOT AT ALL
8. MOVING OR SPEAKING SO SLOWLY THAT OTHER PEOPLE COULD HAVE NOTICED. OR THE OPPOSITE, BEING SO FIGETY OR RESTLESS THAT YOU HAVE BEEN MOVING AROUND A LOT MORE THAN USUAL: NOT AT ALL
10. IF YOU CHECKED OFF ANY PROBLEMS, HOW DIFFICULT HAVE THESE PROBLEMS MADE IT FOR YOU TO DO YOUR WORK, TAKE CARE OF THINGS AT HOME, OR GET ALONG WITH OTHER PEOPLE: NOT DIFFICULT AT ALL
SUM OF ALL RESPONSES TO PHQ QUESTIONS 1-9: 0
SUM OF ALL RESPONSES TO PHQ QUESTIONS 1-9: 0
2. FEELING DOWN, DEPRESSED OR HOPELESS: NOT AT ALL
SUM OF ALL RESPONSES TO PHQ QUESTIONS 1-9: 0

## 2025-07-02 ASSESSMENT — LIFESTYLE VARIABLES
HOW OFTEN DO YOU HAVE A DRINK CONTAINING ALCOHOL: MONTHLY OR LESS
HOW MANY STANDARD DRINKS CONTAINING ALCOHOL DO YOU HAVE ON A TYPICAL DAY: 1 OR 2

## 2025-07-02 NOTE — PROGRESS NOTES
Successful venipuncture in patient's Left Wrist  X 1 sticks.  The patient tolerated this procedure w/o c/o pain or discomfort.

## 2025-07-02 NOTE — PROGRESS NOTES
Medicare Annual Wellness Visit    Keaton Lebron is here for Medicare AWV, Hypertension, and Cholesterol Problem    Assessment & Plan   Medicare annual wellness visit, subsequent     No follow-ups on file.     Subjective       Patient's complete Health Risk Assessment and screening values have been reviewed and are found in Flowsheets. The following problems were reviewed today and where indicated follow up appointments were made and/or referrals ordered.    Positive Risk Factor Screenings with Interventions:              Inactivity:    (!) Abnormal     Interventions:  Patient declined any further interventions or treatment                         Objective   There were no vitals filed for this visit.   There is no height or weight on file to calculate BMI.                  No Known Allergies  Prior to Visit Medications    Medication Sig Taking? Authorizing Provider   hydroCHLOROthiazide 12.5 MG tablet TAKE 1 TABLET DAILY  Yovani Mabry MD   citalopram (CELEXA) 40 MG tablet TAKE 1 TABLET DAILY  Yovani Mabry MD   traZODone (DESYREL) 50 MG tablet Take 1 tablet by mouth every evening  Yovani Mabry MD   CALCIUM PO Take by mouth  Automatic Reconciliation, Ar   ZINC PO Take by mouth  Automatic Reconciliation, Ar   albuterol sulfate HFA (PROVENTIL;VENTOLIN;PROAIR) 108 (90 Base) MCG/ACT inhaler Inhale 2 puffs into the lungs every 4 hours as needed  Patient not taking: Reported on 7/1/2024  Automatic Reconciliation, Ar   ibandronate (BONIVA) 150 MG tablet ceived the following from [facility]: Outside name: ibandronate (BONIVA) 150 mg tablet  Automatic Reconciliation, Ar   letrozole (FEMARA) 2.5 MG tablet ceived the following from [facility]: Outside name: letrozole (FEMARA) 2.5 mg tablet  Automatic Reconciliation, Ar   metroNIDAZOLE (METROGEL) 0.75 % gel Apply topically 2 times daily  Automatic Reconciliation, Ar   Red Yeast Rice Extract 600 MG CAPS Take 600 mg by

## 2025-07-02 NOTE — PROGRESS NOTES
Have you been to the ER, urgent care clinic since your last visit?  Hospitalized since your last visit?   NO    Have you seen or consulted any other health care providers outside our system since your last visit?   NO      “Have you had a colorectal cancer screening such as a colonoscopy/FIT/Cologuard?    No, patient aware she is due     No colonoscopy on file  Date of last Cologuard: 6/22/2022  No FIT/FOBT on file   No flexible sigmoidoscopy on file

## 2025-07-03 ENCOUNTER — TRANSCRIBE ORDERS (OUTPATIENT)
Facility: HOSPITAL | Age: 70
End: 2025-07-03

## 2025-07-03 DIAGNOSIS — Z12.31 VISIT FOR SCREENING MAMMOGRAM: Primary | ICD-10-CM

## 2025-07-03 LAB
ALBUMIN SERPL-MCNC: 3.9 G/DL (ref 3.5–5)
ALBUMIN/GLOB SERPL: 1.3 (ref 1.1–2.2)
ALP SERPL-CCNC: 77 U/L (ref 45–117)
ALT SERPL-CCNC: 23 U/L (ref 12–78)
ANION GAP SERPL CALC-SCNC: 6 MMOL/L (ref 2–12)
AST SERPL-CCNC: 19 U/L (ref 15–37)
BASOPHILS # BLD: 0.02 K/UL (ref 0–0.1)
BASOPHILS NFR BLD: 0.4 % (ref 0–1)
BILIRUB SERPL-MCNC: 0.8 MG/DL (ref 0.2–1)
BUN SERPL-MCNC: 12 MG/DL (ref 6–20)
BUN/CREAT SERPL: 14 (ref 12–20)
CALCIUM SERPL-MCNC: 9.4 MG/DL (ref 8.5–10.1)
CHLORIDE SERPL-SCNC: 98 MMOL/L (ref 97–108)
CHOLEST SERPL-MCNC: 227 MG/DL
CO2 SERPL-SCNC: 30 MMOL/L (ref 21–32)
CREAT SERPL-MCNC: 0.84 MG/DL (ref 0.55–1.02)
DIFFERENTIAL METHOD BLD: NORMAL
EOSINOPHIL # BLD: 0.14 K/UL (ref 0–0.4)
EOSINOPHIL NFR BLD: 2.7 % (ref 0–7)
ERYTHROCYTE [DISTWIDTH] IN BLOOD BY AUTOMATED COUNT: 12.5 % (ref 11.5–14.5)
GLOBULIN SER CALC-MCNC: 3 G/DL (ref 2–4)
GLUCOSE SERPL-MCNC: 88 MG/DL (ref 65–100)
HCT VFR BLD AUTO: 42.2 % (ref 35–47)
HDLC SERPL-MCNC: 79 MG/DL
HDLC SERPL: 2.9 (ref 0–5)
HGB BLD-MCNC: 13.6 G/DL (ref 11.5–16)
IMM GRANULOCYTES # BLD AUTO: 0.01 K/UL (ref 0–0.04)
IMM GRANULOCYTES NFR BLD AUTO: 0.2 % (ref 0–0.5)
LDLC SERPL CALC-MCNC: 130.6 MG/DL (ref 0–100)
LYMPHOCYTES # BLD: 1.12 K/UL (ref 0.8–3.5)
LYMPHOCYTES NFR BLD: 21.7 % (ref 12–49)
MCH RBC QN AUTO: 30.8 PG (ref 26–34)
MCHC RBC AUTO-ENTMCNC: 32.2 G/DL (ref 30–36.5)
MCV RBC AUTO: 95.7 FL (ref 80–99)
MONOCYTES # BLD: 0.48 K/UL (ref 0–1)
MONOCYTES NFR BLD: 9.3 % (ref 5–13)
NEUTS SEG # BLD: 3.4 K/UL (ref 1.8–8)
NEUTS SEG NFR BLD: 65.7 % (ref 32–75)
NRBC # BLD: 0 K/UL (ref 0–0.01)
NRBC BLD-RTO: 0 PER 100 WBC
PLATELET # BLD AUTO: 229 K/UL (ref 150–400)
PMV BLD AUTO: 9.5 FL (ref 8.9–12.9)
POTASSIUM SERPL-SCNC: 3.5 MMOL/L (ref 3.5–5.1)
PROT SERPL-MCNC: 6.9 G/DL (ref 6.4–8.2)
RBC # BLD AUTO: 4.41 M/UL (ref 3.8–5.2)
SODIUM SERPL-SCNC: 134 MMOL/L (ref 136–145)
TRIGL SERPL-MCNC: 87 MG/DL
VLDLC SERPL CALC-MCNC: 17.4 MG/DL
WBC # BLD AUTO: 5.2 K/UL (ref 3.6–11)

## 2025-07-04 ENCOUNTER — RESULTS FOLLOW-UP (OUTPATIENT)
Dept: FAMILY MEDICINE CLINIC | Age: 70
End: 2025-07-04

## 2025-07-27 LAB — NONINV COLON CA DNA+OCC BLD SCRN STL QL: NEGATIVE
